# Patient Record
Sex: FEMALE | Race: WHITE | Employment: OTHER | URBAN - METROPOLITAN AREA
[De-identification: names, ages, dates, MRNs, and addresses within clinical notes are randomized per-mention and may not be internally consistent; named-entity substitution may affect disease eponyms.]

---

## 2017-01-12 PROBLEM — J18.9 PNEUMONIA: Status: ACTIVE | Noted: 2017-01-12

## 2017-01-16 ENCOUNTER — TELEPHONE (OUTPATIENT)
Dept: PULMONOLOGY | Age: 67
End: 2017-01-16

## 2017-01-16 DIAGNOSIS — J18.9 PNEUMONIA DUE TO INFECTIOUS ORGANISM, UNSPECIFIED LATERALITY, UNSPECIFIED PART OF LUNG: Primary | ICD-10-CM

## 2017-01-17 ENCOUNTER — CARE COORDINATION (OUTPATIENT)
Dept: CARE COORDINATION | Age: 67
End: 2017-01-17

## 2017-01-31 RX ORDER — TIOTROPIUM BROMIDE 18 UG/1
CAPSULE ORAL; RESPIRATORY (INHALATION)
Qty: 30 CAPSULE | Refills: 5 | Status: SHIPPED | OUTPATIENT
Start: 2017-01-31 | End: 2017-07-30 | Stop reason: SDUPTHER

## 2017-02-06 RX ORDER — LISINOPRIL AND HYDROCHLOROTHIAZIDE 12.5; 1 MG/1; MG/1
TABLET ORAL
Qty: 90 TABLET | Refills: 0 | Status: SHIPPED | OUTPATIENT
Start: 2017-02-06 | End: 2017-05-10 | Stop reason: SDUPTHER

## 2017-02-13 RX ORDER — TOLTERODINE 2 MG/1
2 CAPSULE, EXTENDED RELEASE ORAL DAILY
Qty: 90 CAPSULE | Refills: 0 | Status: SHIPPED | OUTPATIENT
Start: 2017-02-13 | End: 2017-05-14 | Stop reason: SDUPTHER

## 2017-02-21 ENCOUNTER — TELEPHONE (OUTPATIENT)
Dept: PULMONOLOGY | Age: 67
End: 2017-02-21

## 2017-02-21 ENCOUNTER — OFFICE VISIT (OUTPATIENT)
Dept: PULMONOLOGY | Age: 67
End: 2017-02-21

## 2017-02-21 ENCOUNTER — HOSPITAL ENCOUNTER (OUTPATIENT)
Dept: OTHER | Age: 67
Discharge: HOME | End: 2017-02-21
Attending: INTERNAL MEDICINE | Admitting: INTERNAL MEDICINE

## 2017-02-21 VITALS
TEMPERATURE: 98.4 F | OXYGEN SATURATION: 91 % | SYSTOLIC BLOOD PRESSURE: 122 MMHG | HEART RATE: 91 BPM | DIASTOLIC BLOOD PRESSURE: 74 MMHG | RESPIRATION RATE: 16 BRPM | WEIGHT: 108.4 LBS | HEIGHT: 66 IN | BODY MASS INDEX: 17.42 KG/M2

## 2017-02-21 DIAGNOSIS — J44.1 COPD EXACERBATION (HCC): ICD-10-CM

## 2017-02-21 DIAGNOSIS — J43.9 PULMONARY EMPHYSEMA, UNSPECIFIED EMPHYSEMA TYPE (HCC): Primary | ICD-10-CM

## 2017-02-21 DIAGNOSIS — Z72.0 TOBACCO ABUSE: ICD-10-CM

## 2017-02-21 DIAGNOSIS — J18.9 PNEUMONIA DUE TO INFECTIOUS ORGANISM, UNSPECIFIED LATERALITY, UNSPECIFIED PART OF LUNG: ICD-10-CM

## 2017-02-21 PROCEDURE — 99214 OFFICE O/P EST MOD 30 MIN: CPT | Performed by: INTERNAL MEDICINE

## 2017-03-02 ENCOUNTER — TELEPHONE (OUTPATIENT)
Dept: PULMONOLOGY | Age: 67
End: 2017-03-02

## 2017-03-03 ENCOUNTER — OFFICE VISIT (OUTPATIENT)
Dept: PULMONOLOGY | Age: 67
End: 2017-03-03

## 2017-03-03 VITALS
WEIGHT: 110 LBS | SYSTOLIC BLOOD PRESSURE: 130 MMHG | BODY MASS INDEX: 17.68 KG/M2 | RESPIRATION RATE: 25 BRPM | HEIGHT: 66 IN | OXYGEN SATURATION: 90 % | TEMPERATURE: 98.6 F | DIASTOLIC BLOOD PRESSURE: 89 MMHG | HEART RATE: 122 BPM

## 2017-03-03 DIAGNOSIS — J44.1 COPD EXACERBATION (HCC): Primary | ICD-10-CM

## 2017-03-03 DIAGNOSIS — J44.9 COPD, SEVERE (HCC): ICD-10-CM

## 2017-03-03 DIAGNOSIS — J96.01 ACUTE RESPIRATORY FAILURE WITH HYPOXIA (HCC): ICD-10-CM

## 2017-03-03 PROCEDURE — 99214 OFFICE O/P EST MOD 30 MIN: CPT | Performed by: INTERNAL MEDICINE

## 2017-03-03 RX ORDER — GUAIFENESIN 600 MG/1
600 TABLET, EXTENDED RELEASE ORAL 2 TIMES DAILY
Qty: 60 TABLET | Refills: 3 | Status: SHIPPED | OUTPATIENT
Start: 2017-03-03

## 2017-03-03 RX ORDER — AZITHROMYCIN 250 MG/1
TABLET, FILM COATED ORAL
Qty: 1 PACKET | Refills: 0 | Status: SHIPPED | OUTPATIENT
Start: 2017-03-03 | End: 2017-03-13

## 2017-03-03 RX ORDER — PREDNISONE 10 MG/1
TABLET ORAL
Qty: 30 TABLET | Refills: 0 | Status: SHIPPED | OUTPATIENT
Start: 2017-03-03 | End: 2017-03-13

## 2017-03-03 RX ORDER — FLUTICASONE PROPIONATE 50 MCG
2 SPRAY, SUSPENSION (ML) NASAL DAILY
Qty: 1 BOTTLE | Refills: 3 | Status: SHIPPED | OUTPATIENT
Start: 2017-03-03 | End: 2019-02-02 | Stop reason: SDUPTHER

## 2017-03-07 ENCOUNTER — TELEPHONE (OUTPATIENT)
Dept: PULMONOLOGY | Age: 67
End: 2017-03-07

## 2017-03-07 RX ORDER — BUDESONIDE AND FORMOTEROL FUMARATE DIHYDRATE 80; 4.5 UG/1; UG/1
2 AEROSOL RESPIRATORY (INHALATION) 2 TIMES DAILY
Qty: 1 INHALER | Refills: 5 | Status: SHIPPED | OUTPATIENT
Start: 2017-03-07 | End: 2017-08-09 | Stop reason: SDUPTHER

## 2017-03-09 ENCOUNTER — TELEPHONE (OUTPATIENT)
Dept: PULMONOLOGY | Age: 67
End: 2017-03-09

## 2017-05-09 ENCOUNTER — HOSPITAL ENCOUNTER (OUTPATIENT)
Dept: PULMONOLOGY | Age: 67
Discharge: HOME | End: 2017-05-09
Attending: INTERNAL MEDICINE | Admitting: INTERNAL MEDICINE

## 2017-05-09 ENCOUNTER — OFFICE VISIT (OUTPATIENT)
Dept: PULMONOLOGY | Age: 67
End: 2017-05-09

## 2017-05-09 ENCOUNTER — TELEPHONE (OUTPATIENT)
Dept: PULMONOLOGY | Age: 67
End: 2017-05-09

## 2017-05-09 VITALS
HEIGHT: 66 IN | WEIGHT: 111 LBS | DIASTOLIC BLOOD PRESSURE: 77 MMHG | HEART RATE: 61 BPM | SYSTOLIC BLOOD PRESSURE: 128 MMHG | BODY MASS INDEX: 17.84 KG/M2 | RESPIRATION RATE: 16 BRPM | OXYGEN SATURATION: 93 % | TEMPERATURE: 97.5 F

## 2017-05-09 VITALS — OXYGEN SATURATION: 91 %

## 2017-05-09 DIAGNOSIS — Z72.0 TOBACCO ABUSE: ICD-10-CM

## 2017-05-09 DIAGNOSIS — J43.9 PULMONARY EMPHYSEMA, UNSPECIFIED EMPHYSEMA TYPE (HCC): Primary | ICD-10-CM

## 2017-05-09 DIAGNOSIS — J44.9 COPD, SEVERE (HCC): Primary | ICD-10-CM

## 2017-05-09 DIAGNOSIS — J44.9 CHRONIC OBSTRUCTIVE PULMONARY DISEASE (HCC): ICD-10-CM

## 2017-05-09 DIAGNOSIS — J30.9 ALLERGIC RHINITIS, UNSPECIFIED ALLERGIC RHINITIS TRIGGER, UNSPECIFIED RHINITIS SEASONALITY: ICD-10-CM

## 2017-05-09 PROCEDURE — 99214 OFFICE O/P EST MOD 30 MIN: CPT | Performed by: INTERNAL MEDICINE

## 2017-05-09 RX ORDER — ALBUTEROL SULFATE 2.5 MG/3ML
2.5 SOLUTION RESPIRATORY (INHALATION) ONCE
Status: COMPLETED | OUTPATIENT
Start: 2017-05-09 | End: 2017-05-09

## 2017-05-09 RX ADMIN — ALBUTEROL SULFATE 2.5 MG: 2.5 SOLUTION RESPIRATORY (INHALATION) at 13:51

## 2017-05-10 RX ORDER — LISINOPRIL AND HYDROCHLOROTHIAZIDE 12.5; 1 MG/1; MG/1
TABLET ORAL
Qty: 90 TABLET | Refills: 0 | Status: SHIPPED | OUTPATIENT
Start: 2017-05-10 | End: 2017-08-12 | Stop reason: SDUPTHER

## 2017-05-15 RX ORDER — TOLTERODINE 2 MG/1
CAPSULE, EXTENDED RELEASE ORAL
Qty: 90 CAPSULE | Refills: 1 | Status: SHIPPED | OUTPATIENT
Start: 2017-05-15 | End: 2017-11-11 | Stop reason: SDUPTHER

## 2017-06-06 RX ORDER — NAPROXEN 500 MG/1
TABLET ORAL
Qty: 180 TABLET | Refills: 0 | Status: SHIPPED | OUTPATIENT
Start: 2017-06-06 | End: 2017-09-17 | Stop reason: SDUPTHER

## 2017-07-03 RX ORDER — CYCLOBENZAPRINE HCL 10 MG
TABLET ORAL
Qty: 90 TABLET | Refills: 2 | Status: SHIPPED | OUTPATIENT
Start: 2017-07-03 | End: 2018-05-14 | Stop reason: SDUPTHER

## 2017-07-13 ENCOUNTER — TELEPHONE (OUTPATIENT)
Dept: OTHER | Facility: CLINIC | Age: 67
End: 2017-07-13

## 2017-07-30 DIAGNOSIS — J43.9 PULMONARY EMPHYSEMA, UNSPECIFIED EMPHYSEMA TYPE (HCC): Primary | ICD-10-CM

## 2017-07-31 RX ORDER — TIOTROPIUM BROMIDE 18 UG/1
CAPSULE ORAL; RESPIRATORY (INHALATION)
Qty: 90 CAPSULE | Refills: 0 | Status: SHIPPED | OUTPATIENT
Start: 2017-07-31 | End: 2017-10-31 | Stop reason: SDUPTHER

## 2017-08-09 ENCOUNTER — OFFICE VISIT (OUTPATIENT)
Dept: PULMONOLOGY | Age: 67
End: 2017-08-09

## 2017-08-09 VITALS
BODY MASS INDEX: 18.48 KG/M2 | HEART RATE: 82 BPM | OXYGEN SATURATION: 93 % | HEIGHT: 66 IN | WEIGHT: 115 LBS | SYSTOLIC BLOOD PRESSURE: 128 MMHG | DIASTOLIC BLOOD PRESSURE: 66 MMHG

## 2017-08-09 DIAGNOSIS — Z91.09 ENVIRONMENTAL ALLERGIES: ICD-10-CM

## 2017-08-09 DIAGNOSIS — J43.9 PULMONARY EMPHYSEMA, UNSPECIFIED EMPHYSEMA TYPE (HCC): ICD-10-CM

## 2017-08-09 DIAGNOSIS — J44.9 CHRONIC OBSTRUCTIVE PULMONARY DISEASE, UNSPECIFIED COPD TYPE (HCC): Primary | ICD-10-CM

## 2017-08-09 DIAGNOSIS — J30.9 ALLERGIC RHINITIS, UNSPECIFIED ALLERGIC RHINITIS TRIGGER, UNSPECIFIED RHINITIS SEASONALITY: ICD-10-CM

## 2017-08-09 PROCEDURE — 99214 OFFICE O/P EST MOD 30 MIN: CPT | Performed by: INTERNAL MEDICINE

## 2017-08-09 RX ORDER — BUDESONIDE AND FORMOTEROL FUMARATE DIHYDRATE 80; 4.5 UG/1; UG/1
2 AEROSOL RESPIRATORY (INHALATION) 2 TIMES DAILY
Qty: 1 INHALER | Refills: 5 | Status: SHIPPED | OUTPATIENT
Start: 2017-08-09 | End: 2021-12-27 | Stop reason: SDUPTHER

## 2017-09-18 RX ORDER — NAPROXEN 500 MG/1
TABLET ORAL
Qty: 180 TABLET | Refills: 0 | Status: SHIPPED | OUTPATIENT
Start: 2017-09-18 | End: 2018-02-18 | Stop reason: SDUPTHER

## 2017-10-31 DIAGNOSIS — J43.9 PULMONARY EMPHYSEMA, UNSPECIFIED EMPHYSEMA TYPE (HCC): ICD-10-CM

## 2017-10-31 RX ORDER — TIOTROPIUM BROMIDE 18 UG/1
CAPSULE ORAL; RESPIRATORY (INHALATION)
Qty: 90 CAPSULE | Refills: 0 | Status: SHIPPED | OUTPATIENT
Start: 2017-10-31 | End: 2018-01-29 | Stop reason: SDUPTHER

## 2017-11-13 RX ORDER — TOLTERODINE 2 MG/1
CAPSULE, EXTENDED RELEASE ORAL
Qty: 90 CAPSULE | Refills: 1 | Status: SHIPPED | OUTPATIENT
Start: 2017-11-13 | End: 2018-03-12

## 2017-12-01 RX ORDER — MONTELUKAST SODIUM 10 MG/1
TABLET ORAL
Qty: 90 TABLET | Refills: 0 | Status: SHIPPED | OUTPATIENT
Start: 2017-12-01 | End: 2018-03-01 | Stop reason: SDUPTHER

## 2017-12-12 ENCOUNTER — OFFICE VISIT (OUTPATIENT)
Dept: PULMONOLOGY | Age: 67
End: 2017-12-12

## 2017-12-12 VITALS
SYSTOLIC BLOOD PRESSURE: 138 MMHG | HEIGHT: 66 IN | WEIGHT: 116 LBS | DIASTOLIC BLOOD PRESSURE: 84 MMHG | RESPIRATION RATE: 20 BRPM | OXYGEN SATURATION: 95 % | BODY MASS INDEX: 18.64 KG/M2 | TEMPERATURE: 97.8 F | HEART RATE: 72 BPM

## 2017-12-12 DIAGNOSIS — F41.9 ANXIETY: ICD-10-CM

## 2017-12-12 DIAGNOSIS — Z23 NEED FOR PNEUMOCOCCAL VACCINATION: ICD-10-CM

## 2017-12-12 DIAGNOSIS — J30.9 CHRONIC ALLERGIC RHINITIS, UNSPECIFIED SEASONALITY, UNSPECIFIED TRIGGER: ICD-10-CM

## 2017-12-12 DIAGNOSIS — J44.9 CHRONIC OBSTRUCTIVE PULMONARY DISEASE, UNSPECIFIED COPD TYPE (HCC): ICD-10-CM

## 2017-12-12 DIAGNOSIS — Z23 NEED FOR INFLUENZA VACCINATION: Primary | ICD-10-CM

## 2017-12-12 PROCEDURE — 90472 IMMUNIZATION ADMIN EACH ADD: CPT | Performed by: INTERNAL MEDICINE

## 2017-12-12 PROCEDURE — 90732 PPSV23 VACC 2 YRS+ SUBQ/IM: CPT | Performed by: INTERNAL MEDICINE

## 2017-12-12 PROCEDURE — 90662 IIV NO PRSV INCREASED AG IM: CPT | Performed by: INTERNAL MEDICINE

## 2017-12-12 PROCEDURE — 90471 IMMUNIZATION ADMIN: CPT | Performed by: INTERNAL MEDICINE

## 2017-12-12 PROCEDURE — 99214 OFFICE O/P EST MOD 30 MIN: CPT | Performed by: INTERNAL MEDICINE

## 2017-12-12 NOTE — PROGRESS NOTES
[facility] Pulmonary, Critical Care, and Sleep    Outpatient Follow Up Note    CC: COPD  Consulting provider: Dr. Jay Rollins    Interval History: 78 y.o. female SOB is worse with anxiety but otherwise is stable. She does not use her inhalers every day. No wheezing. No exacerbations. Initial HPI: referred for COPD. Does not use inhaler daily. Does wheeze every day and has dry cough daily. Uses PRN inhaler less than once per week. Uses advair less than once per week. She waits until symptoms are flared and then stars using inhalers when she can hardly breath. She states she has some environmental/seasonal allergies that cause wheezing and itchy eyes.      Current Medications:    Current Outpatient Prescriptions:     montelukast (SINGULAIR) 10 MG tablet, TAKE 1 TABLET NIGHTLY, Disp: 90 tablet, Rfl: 0    tolterodine (DETROL LA) 2 MG extended release capsule, TAKE 1 CAPSULE DAILY, Disp: 90 capsule, Rfl: 1    SPIRIVA HANDIHALER 18 MCG inhalation capsule, INHALE THE CONTENTS OF 1 CAPSULE DAILY, Disp: 90 capsule, Rfl: 0    verapamil (CALAN SR) 120 MG extended release tablet, TAKE 2 TABLETS AT BEDTIME, Disp: 180 tablet, Rfl: 0    naproxen (NAPROSYN) 500 MG tablet, TAKE ONE TABLET BY MOUTH TWICE A DAY WITH FOOD, Disp: 180 tablet, Rfl: 0    lisinopril-hydrochlorothiazide (PRINZIDE;ZESTORETIC) 10-12.5 MG per tablet, TAKE ONE TABLET BY MOUTH DAILY, Disp: 90 tablet, Rfl: 1    budesonide-formoterol (SYMBICORT) 80-4.5 MCG/ACT AERO, Inhale 2 puffs into the lungs 2 times daily, Disp: 1 Inhaler, Rfl: 5    cyclobenzaprine (FLEXERIL) 10 MG tablet, TAKE ONE TABLET BY MOUTH THREE TIMES A DAY AS NEEDED, Disp: 90 tablet, Rfl: 2    guaiFENesin (MUCINEX) 600 MG extended release tablet, Take 1 tablet by mouth 2 times daily, Disp: 60 tablet, Rfl: 3    fluticasone (FLONASE) 50 MCG/ACT nasal spray, 2 sprays by Nasal route daily, Disp: 1 Bottle, Rfl: 3    nicotine (NICODERM CQ) 7 MG/24HR, Place 1 patch onto the skin every 24 hours, Disp: 30 patch, Rfl: 3    VESICARE 5 MG tablet, TAKE 1 TABLET DAILY, Disp: 90 tablet, Rfl: 1    Loratadine 10 MG CAPS, Take by mouth daily, Disp: , Rfl:     lidocaine (LIDODERM) 5 %, Place 1 patch onto the skin daily. 12 hours on, 12 hours off., Disp: 90 patch, Rfl: 0    Multiple Vitamins-Minerals (MULTIVITAMIN PO), Take 1 tablet by mouth daily. , Disp: , Rfl:     Potassium Gluconate 550 MG TABS, Take 1 tablet by mouth daily. , Disp: , Rfl:     Objective:   PHYSICAL EXAM:    /84 (Site: Left Arm, Position: Sitting, Cuff Size: Small Adult)   Pulse 72   Temp 97.8 °F (36.6 °C) (Oral)   Resp 20   Ht 5' 6\" (1.676 m)   Wt 116 lb (52.6 kg)   SpO2 95% Comment: RA  BMI 18.72 kg/m²   Constitutional: In no acute distress. Appears stated age. Well developed and nourished  Eyes: PERRL. No sclera icterus. No conjunctival injection. ENT: Oropharynx with mild erythema. Neck: Trachea midline. No thyroid tenderness. Lymph: No cervical LAD. No supraclavicular LAD. Resp: No accessory muscle use. No crackles. no  wheezes. No rhonchi. CV: Regular rate. Regular rhythm. No murmur or rub. No lower extremity edema. Musc: No clubbing. No cyanosis. No synovitis or joint deformity in digits. Psych: Oriented x 3. Mood and affect normal. Intact judgment and insight. LABS:  Reviewed any pertinent new labs that are available.     PFTs   8/16/14 FVC  (%) FEV1  1.11 L(42%) FEV1/FVC ratio  52  TLC  (85%)  RV  (122%)   DLCO (46%) Bronchodilator response:no  5/9/17 FVC  (77%) FEV1  1.33 L(51%) FEV1/FVC ratio  51  TLC  (92%)  RV  (128%)   DLCO (46%) Bronchodilator response:no    6MWT: 720ft    IMAGING:  I personally reviewed and interpreted the following today in the office:   9/17/13 CXR: Emphysema/hyperinflation   2/21/17 CXR: clear lungs    ASSESSMENT:   Severe COPD with emphysema  Seasonal/environmental allergies   Allergic Rhinitis   Tobacco abuse - quit 2/2017  Over 30 pack year smoking history  Anxiety    PLAN:   Continue

## 2017-12-12 NOTE — PROGRESS NOTES
Vaccine Information Sheet, \"Influenza - Inactivated\"  given to Alysia Ellsworth, or parent/legal guardian of  Alysia Ellsworth and verbalized understanding. Patient responses:    Have you ever had a reaction to a flu vaccine? No  Are you able to eat eggs without adverse effects? Yes  Do you have any current illness? No  Have you ever had Guillian [place] Syndrome? No    Flu vaccine given per order. Please see immunization tab.

## 2018-01-29 DIAGNOSIS — J43.9 PULMONARY EMPHYSEMA, UNSPECIFIED EMPHYSEMA TYPE (HCC): ICD-10-CM

## 2018-01-30 RX ORDER — TIOTROPIUM BROMIDE 18 UG/1
CAPSULE ORAL; RESPIRATORY (INHALATION)
Qty: 90 CAPSULE | Refills: 1 | Status: SHIPPED | OUTPATIENT
Start: 2018-01-30 | End: 2018-07-29 | Stop reason: SDUPTHER

## 2018-02-19 RX ORDER — LISINOPRIL AND HYDROCHLOROTHIAZIDE 12.5; 1 MG/1; MG/1
TABLET ORAL
Qty: 90 TABLET | Refills: 0 | Status: SHIPPED | OUTPATIENT
Start: 2018-02-19 | End: 2018-05-21 | Stop reason: SDUPTHER

## 2018-02-19 RX ORDER — NAPROXEN 500 MG/1
TABLET ORAL
Qty: 180 TABLET | Refills: 0 | Status: SHIPPED | OUTPATIENT
Start: 2018-02-19 | End: 2018-12-17 | Stop reason: SDUPTHER

## 2018-03-01 RX ORDER — MONTELUKAST SODIUM 10 MG/1
TABLET ORAL
Qty: 90 TABLET | Refills: 0 | Status: SHIPPED | OUTPATIENT
Start: 2018-03-01 | End: 2018-05-30 | Stop reason: SDUPTHER

## 2018-03-12 ENCOUNTER — OFFICE VISIT (OUTPATIENT)
Dept: FAMILY MEDICINE CLINIC | Age: 68
End: 2018-03-12

## 2018-03-12 VITALS
HEIGHT: 66 IN | HEART RATE: 69 BPM | SYSTOLIC BLOOD PRESSURE: 130 MMHG | BODY MASS INDEX: 18.96 KG/M2 | DIASTOLIC BLOOD PRESSURE: 72 MMHG | OXYGEN SATURATION: 97 % | WEIGHT: 118 LBS

## 2018-03-12 DIAGNOSIS — I10 ESSENTIAL HYPERTENSION: Primary | ICD-10-CM

## 2018-03-12 DIAGNOSIS — M15.9 PRIMARY OSTEOARTHRITIS INVOLVING MULTIPLE JOINTS: ICD-10-CM

## 2018-03-12 DIAGNOSIS — I73.00 RAYNAUD'S DISEASE WITHOUT GANGRENE: ICD-10-CM

## 2018-03-12 DIAGNOSIS — N39.46 URINARY INCONTINENCE, MIXED: ICD-10-CM

## 2018-03-12 DIAGNOSIS — F41.9 ANXIETY: ICD-10-CM

## 2018-03-12 PROCEDURE — 99214 OFFICE O/P EST MOD 30 MIN: CPT | Performed by: FAMILY MEDICINE

## 2018-03-12 RX ORDER — OXYBUTYNIN CHLORIDE 5 MG/1
TABLET ORAL
Qty: 60 TABLET | Refills: 0 | Status: SHIPPED | OUTPATIENT
Start: 2018-03-12 | End: 2018-05-01 | Stop reason: SDUPTHER

## 2018-03-12 RX ORDER — CITALOPRAM 10 MG/1
TABLET ORAL
Qty: 60 TABLET | Refills: 0 | Status: SHIPPED | OUTPATIENT
Start: 2018-03-12 | End: 2018-05-01 | Stop reason: SDUPTHER

## 2018-03-12 ASSESSMENT — ENCOUNTER SYMPTOMS
SHORTNESS OF BREATH: 1
GASTROINTESTINAL NEGATIVE: 1

## 2018-03-12 ASSESSMENT — PATIENT HEALTH QUESTIONNAIRE - PHQ9
SUM OF ALL RESPONSES TO PHQ QUESTIONS 1-9: 0
2. FEELING DOWN, DEPRESSED OR HOPELESS: 0
1. LITTLE INTEREST OR PLEASURE IN DOING THINGS: 0
SUM OF ALL RESPONSES TO PHQ9 QUESTIONS 1 & 2: 0

## 2018-03-12 NOTE — PROGRESS NOTES
and thought content normal.       Assessment:      1. Essential hypertension    2. Urinary incontinence, mixed    3. Raynaud's disease without gangrene    4. Primary osteoarthritis involving multiple joints              Plan:      Saul Hu was seen today for hypertension, copd and hand pain.     Diagnoses and all orders for this visit:    Essential hypertension  Continue meds-FERNY diet  Urinary incontinence, mixed  Rx Oxybutrin-call me 3 weeks  Raynaud's disease without gangrene  Continue meds,gloves  Primary osteoarthritis involving multiple joints  ASA as needed  Other orders  -     oxybutynin (DITROPAN) 5 MG tablet; 1 daily x 2 weeks then 1 bid    See me 6 mos

## 2018-05-02 RX ORDER — CITALOPRAM 10 MG/1
TABLET ORAL
Qty: 90 TABLET | Refills: 0 | Status: SHIPPED | OUTPATIENT
Start: 2018-05-02 | End: 2018-05-07

## 2018-05-02 RX ORDER — OXYBUTYNIN CHLORIDE 5 MG/1
TABLET ORAL
Qty: 180 TABLET | Refills: 0 | Status: SHIPPED | OUTPATIENT
Start: 2018-05-02 | End: 2018-07-14 | Stop reason: SDUPTHER

## 2018-05-14 RX ORDER — CYCLOBENZAPRINE HCL 10 MG
TABLET ORAL
Qty: 90 TABLET | Refills: 1 | Status: SHIPPED | OUTPATIENT
Start: 2018-05-14 | End: 2019-07-01 | Stop reason: SDUPTHER

## 2018-05-21 RX ORDER — LISINOPRIL AND HYDROCHLOROTHIAZIDE 12.5; 1 MG/1; MG/1
TABLET ORAL
Qty: 90 TABLET | Refills: 0 | Status: SHIPPED | OUTPATIENT
Start: 2018-05-21 | End: 2018-08-13 | Stop reason: SDUPTHER

## 2018-05-30 RX ORDER — MONTELUKAST SODIUM 10 MG/1
TABLET ORAL
Qty: 90 TABLET | Refills: 0 | Status: SHIPPED | OUTPATIENT
Start: 2018-05-30 | End: 2018-08-28 | Stop reason: SDUPTHER

## 2018-06-12 ENCOUNTER — OFFICE VISIT (OUTPATIENT)
Dept: PULMONOLOGY | Age: 68
End: 2018-06-12

## 2018-06-12 VITALS
OXYGEN SATURATION: 95 % | BODY MASS INDEX: 18.64 KG/M2 | WEIGHT: 116 LBS | HEIGHT: 66 IN | HEART RATE: 64 BPM | SYSTOLIC BLOOD PRESSURE: 124 MMHG | RESPIRATION RATE: 21 BRPM | DIASTOLIC BLOOD PRESSURE: 84 MMHG

## 2018-06-12 DIAGNOSIS — J30.1 ACUTE SEASONAL ALLERGIC RHINITIS DUE TO POLLEN: ICD-10-CM

## 2018-06-12 DIAGNOSIS — Z91.09 ENVIRONMENTAL ALLERGIES: ICD-10-CM

## 2018-06-12 DIAGNOSIS — J44.9 COPD, SEVERE (HCC): Primary | ICD-10-CM

## 2018-06-12 PROCEDURE — 99214 OFFICE O/P EST MOD 30 MIN: CPT | Performed by: INTERNAL MEDICINE

## 2018-06-12 RX ORDER — LORATADINE 10 MG/1
10 TABLET ORAL DAILY
Qty: 30 TABLET | Refills: 5 | Status: SHIPPED | OUTPATIENT
Start: 2018-06-12 | End: 2020-06-11

## 2018-07-02 NOTE — TELEPHONE ENCOUNTER
Last Seen: 3/12/2018  Next Appointment: Visit date not found    Requested Prescriptions     Pending Prescriptions Disp Refills    verapamil (CALAN SR) 120 MG extended release tablet [Pharmacy Med Name: VERAPAMIL HCL ER TABS 120MG] 180 tablet 1     Sig: TAKE 2 TABLETS AT BEDTIME

## 2018-07-29 DIAGNOSIS — J43.9 PULMONARY EMPHYSEMA, UNSPECIFIED EMPHYSEMA TYPE (HCC): ICD-10-CM

## 2018-07-30 RX ORDER — TIOTROPIUM BROMIDE 18 UG/1
CAPSULE ORAL; RESPIRATORY (INHALATION)
Qty: 90 CAPSULE | Refills: 1 | Status: SHIPPED | OUTPATIENT
Start: 2018-07-30 | End: 2019-01-26 | Stop reason: SDUPTHER

## 2018-08-28 RX ORDER — MONTELUKAST SODIUM 10 MG/1
TABLET ORAL
Qty: 90 TABLET | Refills: 1 | Status: SHIPPED | OUTPATIENT
Start: 2018-08-28 | End: 2019-02-24 | Stop reason: SDUPTHER

## 2018-12-11 ENCOUNTER — OFFICE VISIT (OUTPATIENT)
Dept: PULMONOLOGY | Age: 68
End: 2018-12-11
Payer: COMMERCIAL

## 2018-12-11 VITALS
HEIGHT: 66 IN | SYSTOLIC BLOOD PRESSURE: 108 MMHG | WEIGHT: 120 LBS | RESPIRATION RATE: 16 BRPM | HEART RATE: 76 BPM | DIASTOLIC BLOOD PRESSURE: 68 MMHG | TEMPERATURE: 98.1 F | BODY MASS INDEX: 19.29 KG/M2 | OXYGEN SATURATION: 92 %

## 2018-12-11 DIAGNOSIS — J44.9 COPD, SEVERE (HCC): ICD-10-CM

## 2018-12-11 DIAGNOSIS — Z91.09 ENVIRONMENTAL ALLERGIES: ICD-10-CM

## 2018-12-11 DIAGNOSIS — Z87.891 PERSONAL HISTORY OF TOBACCO USE: Primary | ICD-10-CM

## 2018-12-11 PROCEDURE — 99214 OFFICE O/P EST MOD 30 MIN: CPT | Performed by: INTERNAL MEDICINE

## 2018-12-11 PROCEDURE — G0296 VISIT TO DETERM LDCT ELIG: HCPCS | Performed by: INTERNAL MEDICINE

## 2018-12-18 RX ORDER — NAPROXEN 500 MG/1
TABLET ORAL
Qty: 180 TABLET | Refills: 0 | Status: SHIPPED | OUTPATIENT
Start: 2018-12-18 | End: 2019-07-01 | Stop reason: SDUPTHER

## 2018-12-18 NOTE — TELEPHONE ENCOUNTER
.  Last office visit  3/12/18    Last written 2/19/18 #180 with no refills     Next office visit scheduled none    Requested Prescriptions     Pending Prescriptions Disp Refills    naproxen (NAPROSYN) 500 MG tablet [Pharmacy Med Name: NAPROXEN 500 MG TABLET] 180 tablet 0     Sig: TAKE ONE TABLET BY MOUTH TWICE A DAY WITH FOOD

## 2019-01-26 DIAGNOSIS — J43.9 PULMONARY EMPHYSEMA, UNSPECIFIED EMPHYSEMA TYPE (HCC): ICD-10-CM

## 2019-01-28 RX ORDER — TIOTROPIUM BROMIDE 18 UG/1
CAPSULE ORAL; RESPIRATORY (INHALATION)
Qty: 90 CAPSULE | Refills: 1 | Status: SHIPPED | OUTPATIENT
Start: 2019-01-28 | End: 2021-09-13 | Stop reason: SDUPTHER

## 2019-02-04 RX ORDER — FLUTICASONE PROPIONATE 50 MCG
SPRAY, SUSPENSION (ML) NASAL
Qty: 1 BOTTLE | Refills: 5 | Status: SHIPPED | OUTPATIENT
Start: 2019-02-04

## 2019-02-25 RX ORDER — MONTELUKAST SODIUM 10 MG/1
TABLET ORAL
Qty: 90 TABLET | Refills: 3 | Status: SHIPPED | OUTPATIENT
Start: 2019-02-25 | End: 2020-02-20

## 2019-06-18 ENCOUNTER — OFFICE VISIT (OUTPATIENT)
Dept: PULMONOLOGY | Age: 69
End: 2019-06-18
Payer: COMMERCIAL

## 2019-06-18 VITALS
BODY MASS INDEX: 18 KG/M2 | DIASTOLIC BLOOD PRESSURE: 78 MMHG | TEMPERATURE: 98.3 F | SYSTOLIC BLOOD PRESSURE: 112 MMHG | OXYGEN SATURATION: 93 % | WEIGHT: 112 LBS | HEIGHT: 66 IN | HEART RATE: 82 BPM | RESPIRATION RATE: 16 BRPM

## 2019-06-18 DIAGNOSIS — Z87.891 PERSONAL HISTORY OF TOBACCO USE: ICD-10-CM

## 2019-06-18 DIAGNOSIS — J30.1 ACUTE SEASONAL ALLERGIC RHINITIS DUE TO POLLEN: ICD-10-CM

## 2019-06-18 DIAGNOSIS — J44.9 COPD, SEVERE (HCC): Primary | ICD-10-CM

## 2019-06-18 PROCEDURE — 99214 OFFICE O/P EST MOD 30 MIN: CPT | Performed by: INTERNAL MEDICINE

## 2019-06-18 NOTE — PROGRESS NOTES
Disp: 90 tablet, Rfl: 1    DiphenhydrAMINE HCl (BENADRYL ALLERGY PO), Take by mouth, Disp: , Rfl:     budesonide-formoterol (SYMBICORT) 80-4.5 MCG/ACT AERO, Inhale 2 puffs into the lungs 2 times daily, Disp: 1 Inhaler, Rfl: 5    guaiFENesin (MUCINEX) 600 MG extended release tablet, Take 1 tablet by mouth 2 times daily, Disp: 60 tablet, Rfl: 3    Loratadine 10 MG CAPS, Take by mouth daily, Disp: , Rfl:     lidocaine (LIDODERM) 5 %, Place 1 patch onto the skin daily. 12 hours on, 12 hours off., Disp: 90 patch, Rfl: 0    Multiple Vitamins-Minerals (MULTIVITAMIN PO), Take 1 tablet by mouth daily. , Disp: , Rfl:     Potassium Gluconate 550 MG TABS, Take 1 tablet by mouth daily. , Disp: , Rfl:     Objective:   PHYSICAL EXAM:    /78   Pulse 82   Temp 98.3 °F (36.8 °C) (Oral)   Resp 16   Ht 5' 6\" (1.676 m)   Wt 112 lb (50.8 kg)   SpO2 93% Comment: ra  BMI 18.08 kg/m²   Constitutional: In no acute distress. Appears stated age. Well developed and nourished  Eyes: PERRL. No sclera icterus. No conjunctival injection. ENT: Oropharynx with mild erythema. Neck: Trachea midline. No thyroid tenderness. Lymph: No cervical LAD. No supraclavicular LAD. Resp: No accessory muscle use. No crackles. No  wheezes. No rhonchi. CV: Regular rate. Regular rhythm. No murmur or rub. No lower extremity edema. Musc: No clubbing. No cyanosis. No synovitis or joint deformity in digits. Psych: Oriented x 3. Mood and affect normal. Intact judgment and insight. LABS:  Reviewed any pertinent new labs that are available.     PFTs   8/16/14 FVC  (%) FEV1  1.11 L(42%) FEV1/FVC ratio  52  TLC  (85%)  RV  (122%)   DLCO (46%) Bronchodilator response:no  5/9/17 FVC  (77%) FEV1  1.33 L(51%) FEV1/FVC ratio  51  TLC  (92%)  RV  (128%)   DLCO (46%) Bronchodilator response:no    6MWT: 720ft    IMAGING:  I personally reviewed and interpreted the following today in the office:   9/17/13 CXR: Emphysema/hyperinflation   2/21/17 CXR: clear lungs    ASSESSMENT:   Severe COPD   Seasonal/environmental allergies   Allergic Rhinitis   Tobacco abuse - quit 1/2017  Over 30 pack year smoking history  Anxiety and depression    PLAN:   Continue Symbicort and Spiriva   Continue PRN albuterol   Continue Singulair  Claritin and Flonase  Pulm rehab referral placed but patient too busy to attend  F/u in 6 months  No objections for cataract surgery and no need for pre-op pulmonary testing  Return for LDCT  Screening CT scan was considered in a lung cancer screening counseling and shared decision making visit today that included the following elements:   Eligibility: Age:69. There are no signs or symptoms of lung cancer. Tobacco History 55 pack-years, quit 2 years ago in 2017  Verbal counseling has been performed by me to include benefits and harms of screening, follow-up diagnostic testing, over-diagnosis, false positive rate, and total radiation exposure;   I have counseled on the importance of adherence to annual lung cancer LDCT screening, the impact of comorbidities and patient is willing to undergo diagnosis and treatment;   I have provided counseling on the importance of maintaining cigarette smoking abstinence if former smoker; or the importance of smoking cessation if current smoker and, if appropriate, furnishing of information about tobacco cessation interventions; and   I have furnished a written order for lung cancer screening with LDCT. Order for Screening chest CT scan should be placed with documentation as below:  Beneficiary date of birth;    Actual pack - year smoking history (number) from above;   Current smoking status, and for former smokers, the number of years since quitting smoking from above  Beneficiary is asymptomatic   National Provider Identifier (NPI) for Dr. Rodriguez Jolly

## 2019-07-01 ENCOUNTER — TELEPHONE (OUTPATIENT)
Dept: FAMILY MEDICINE CLINIC | Age: 69
End: 2019-07-01

## 2019-07-01 ENCOUNTER — OFFICE VISIT (OUTPATIENT)
Dept: FAMILY MEDICINE CLINIC | Age: 69
End: 2019-07-01
Payer: MEDICARE

## 2019-07-01 VITALS
DIASTOLIC BLOOD PRESSURE: 75 MMHG | SYSTOLIC BLOOD PRESSURE: 135 MMHG | HEART RATE: 90 BPM | WEIGHT: 112.2 LBS | BODY MASS INDEX: 18.03 KG/M2 | HEIGHT: 66 IN | OXYGEN SATURATION: 92 %

## 2019-07-01 DIAGNOSIS — J30.1 SEASONAL ALLERGIC RHINITIS DUE TO POLLEN: ICD-10-CM

## 2019-07-01 DIAGNOSIS — F41.9 ANXIETY: ICD-10-CM

## 2019-07-01 DIAGNOSIS — I10 ESSENTIAL HYPERTENSION: Primary | ICD-10-CM

## 2019-07-01 PROCEDURE — 4040F PNEUMOC VAC/ADMIN/RCVD: CPT | Performed by: FAMILY MEDICINE

## 2019-07-01 PROCEDURE — 1090F PRES/ABSN URINE INCON ASSESS: CPT | Performed by: FAMILY MEDICINE

## 2019-07-01 PROCEDURE — 1123F ACP DISCUSS/DSCN MKR DOCD: CPT | Performed by: FAMILY MEDICINE

## 2019-07-01 PROCEDURE — G8400 PT W/DXA NO RESULTS DOC: HCPCS | Performed by: FAMILY MEDICINE

## 2019-07-01 PROCEDURE — 3017F COLORECTAL CA SCREEN DOC REV: CPT | Performed by: FAMILY MEDICINE

## 2019-07-01 PROCEDURE — G0444 DEPRESSION SCREEN ANNUAL: HCPCS | Performed by: FAMILY MEDICINE

## 2019-07-01 PROCEDURE — G8427 DOCREV CUR MEDS BY ELIG CLIN: HCPCS | Performed by: FAMILY MEDICINE

## 2019-07-01 PROCEDURE — 99214 OFFICE O/P EST MOD 30 MIN: CPT | Performed by: FAMILY MEDICINE

## 2019-07-01 PROCEDURE — 1036F TOBACCO NON-USER: CPT | Performed by: FAMILY MEDICINE

## 2019-07-01 PROCEDURE — G8419 CALC BMI OUT NRM PARAM NOF/U: HCPCS | Performed by: FAMILY MEDICINE

## 2019-07-01 RX ORDER — CYCLOBENZAPRINE HCL 10 MG
TABLET ORAL
Qty: 90 TABLET | Refills: 1 | Status: SHIPPED | OUTPATIENT
Start: 2019-07-01 | End: 2020-10-23

## 2019-07-01 RX ORDER — CITALOPRAM 20 MG/1
20 TABLET ORAL DAILY
Qty: 90 TABLET | Refills: 1 | Status: SHIPPED | OUTPATIENT
Start: 2019-07-01 | End: 2019-12-24

## 2019-07-01 ASSESSMENT — PATIENT HEALTH QUESTIONNAIRE - PHQ9
4. FEELING TIRED OR HAVING LITTLE ENERGY: 3
1. LITTLE INTEREST OR PLEASURE IN DOING THINGS: 3
3. TROUBLE FALLING OR STAYING ASLEEP: 0
9. THOUGHTS THAT YOU WOULD BE BETTER OFF DEAD, OR OF HURTING YOURSELF: 1
8. MOVING OR SPEAKING SO SLOWLY THAT OTHER PEOPLE COULD HAVE NOTICED. OR THE OPPOSITE, BEING SO FIGETY OR RESTLESS THAT YOU HAVE BEEN MOVING AROUND A LOT MORE THAN USUAL: 1
SUM OF ALL RESPONSES TO PHQ9 QUESTIONS 1 & 2: 6
7. TROUBLE CONCENTRATING ON THINGS, SUCH AS READING THE NEWSPAPER OR WATCHING TELEVISION: 1
2. FEELING DOWN, DEPRESSED OR HOPELESS: 3
SUM OF ALL RESPONSES TO PHQ QUESTIONS 1-9: 15
10. IF YOU CHECKED OFF ANY PROBLEMS, HOW DIFFICULT HAVE THESE PROBLEMS MADE IT FOR YOU TO DO YOUR WORK, TAKE CARE OF THINGS AT HOME, OR GET ALONG WITH OTHER PEOPLE: 1
SUM OF ALL RESPONSES TO PHQ QUESTIONS 1-9: 15
5. POOR APPETITE OR OVEREATING: 2
6. FEELING BAD ABOUT YOURSELF - OR THAT YOU ARE A FAILURE OR HAVE LET YOURSELF OR YOUR FAMILY DOWN: 1

## 2019-07-01 ASSESSMENT — ENCOUNTER SYMPTOMS
ABDOMINAL PAIN: 0
COUGH: 0
BLOOD IN STOOL: 0
RHINORRHEA: 1

## 2019-07-01 NOTE — PROGRESS NOTES
Subjective:      Patient ID: Tera Dyer is a 77 y.o. female. HPI  Patient in for a general checkup. Her  is passed and she has very busy with things she has to do. Hypertension-this is been under control at home and at the store. She feels like her anxiety level is up some and she may need to increase her Celexa. She is having cataract surgery soon and needs to have a full mouth extraction due to poor dental care. Allergic rhinitis- her allergies has been flared and she is taken over-the-counter medications. Prior to Visit Medications :  Medication verapamil (CALAN SR) 120 MG extended release tablet, Sig TAKE 2 TABLETS AT BEDTIME, Taking? Yes, Authorizing Provider Chico Siegel, DO    Medication citalopram (CELEXA) 20 MG tablet, Sig Take 1 tablet by mouth daily, Taking? Yes, Authorizing Provider Chico Siegel, DO    Medication cyclobenzaprine (FLEXERIL) 10 MG tablet, Sig TAKE ONE TABLET BY MOUTH THREE TIMES A DAY AS NEEDED, Taking? Yes, Authorizing Provider Chico Siegel, DO    Medication montelukast (SINGULAIR) 10 MG tablet, Sig TAKE 1 TABLET NIGHTLY, Taking? Yes, Authorizing Provider Chico Siegel, DO    Medication lisinopril-hydrochlorothiazide (PRINZIDE;ZESTORETIC) 10-12.5 MG per tablet, Sig TAKE ONE TABLET BY MOUTH DAILY, Taking? Yes, Authorizing Provider Chico Siegel, DO    Medication fluticasone (FLONASE) 50 MCG/ACT nasal spray, Sig PLACE TWO SPRAYS IN EACH NOSTRIL ONCE DAILY, Taking? Yes, Authorizing Provider Clearance Lacrosse, APRN - CNP    Medication SPIRIVA HANDIHALER 18 MCG inhalation capsule, Sig INHALE THE CONTENTS OF 1 CAPSULE DAILY, Taking? Yes, Authorizing Provider Clearance Lacrosse, APRN - CNP    Medication oxybutynin (DITROPAN) 5 MG tablet, Sig TAKE 1 TABLET TWICE A DAY, Taking? Yes, Authorizing Provider Chico Siegel, DO    Medication naproxen (NAPROSYN) 500 MG tablet, Sig TAKE ONE TABLET BY MOUTH TWICE A DAY WITH FOOD, Taking?  Yes, Authorizing Provider Marquez Blanco, hypertension  She is to continue her medications and no added salt diet. She is to monitor her weight weekly due to the fact that she says she has lost some. Seasonal allergic rhinitis due to pollen  Continue medications as needed. Anxiety  She is to increase her Celexa to 20 mg daily. I will be seeing her in 6 to 8 weeks for a preop physical for cataracts. Other orders  -     citalopram (CELEXA) 20 MG tablet;  Take 1 tablet by mouth daily  -     cyclobenzaprine (FLEXERIL) 10 MG tablet; TAKE ONE TABLET BY MOUTH THREE TIMES A DAY AS NEEDED            Chico Siegel, DO

## 2019-07-15 RX ORDER — OXYBUTYNIN CHLORIDE 5 MG/1
TABLET ORAL
Qty: 180 TABLET | Refills: 1 | Status: SHIPPED | OUTPATIENT
Start: 2019-07-15 | End: 2019-08-16 | Stop reason: SDUPTHER

## 2019-07-15 RX ORDER — CITALOPRAM 20 MG/1
20 TABLET ORAL DAILY
Qty: 90 TABLET | Refills: 1 | Status: CANCELLED | OUTPATIENT
Start: 2019-07-15

## 2019-08-16 RX ORDER — OXYBUTYNIN CHLORIDE 5 MG/1
TABLET ORAL
Qty: 180 TABLET | Refills: 1 | Status: SHIPPED | OUTPATIENT
Start: 2019-08-16 | End: 2020-01-13

## 2019-08-16 RX ORDER — LISINOPRIL AND HYDROCHLOROTHIAZIDE 12.5; 1 MG/1; MG/1
TABLET ORAL
Qty: 90 TABLET | Refills: 0 | Status: SHIPPED | OUTPATIENT
Start: 2019-08-16 | End: 2019-11-18 | Stop reason: SDUPTHER

## 2019-08-16 NOTE — TELEPHONE ENCOUNTER
Elida Guzmán [phone] (home)    is requesting refill(s) of medication oxybutynin (DITROPAN) 5 MG     [facility]     Last OV 07/15/2019 (pertaining to medication)   Last refill 07/15/2019 (per medication requested)  Next office visit scheduled or attempted NA  Date na  If No, reason na

## 2019-08-16 NOTE — TELEPHONE ENCOUNTER
.  Last office visit 7/1/2019     Last written 2/12/19 90 tablet 1 refill    Next office visit scheduled No future OV scheduled     Requested Prescriptions     Pending Prescriptions Disp Refills    lisinopril-hydrochlorothiazide (PRINZIDE;ZESTORETIC) 10-12.5 MG per tablet [Pharmacy Med Name: LISINOPRIL-HCTZ 10-12.5 MG TAB] 90 tablet 0     Sig: TAKE ONE TABLET BY MOUTH DAILY

## 2019-08-28 ENCOUNTER — OFFICE VISIT (OUTPATIENT)
Dept: FAMILY MEDICINE CLINIC | Age: 69
End: 2019-08-28
Payer: MEDICARE

## 2019-08-28 VITALS
DIASTOLIC BLOOD PRESSURE: 60 MMHG | OXYGEN SATURATION: 90 % | HEART RATE: 90 BPM | SYSTOLIC BLOOD PRESSURE: 85 MMHG | HEIGHT: 66 IN | BODY MASS INDEX: 17.1 KG/M2 | WEIGHT: 106.4 LBS

## 2019-08-28 DIAGNOSIS — J44.9 CHRONIC OBSTRUCTIVE PULMONARY DISEASE, UNSPECIFIED COPD TYPE (HCC): ICD-10-CM

## 2019-08-28 DIAGNOSIS — Z01.818 PRE-OP EXAMINATION: ICD-10-CM

## 2019-08-28 DIAGNOSIS — H26.9 CATARACT OF LEFT EYE, UNSPECIFIED CATARACT TYPE: Primary | ICD-10-CM

## 2019-08-28 PROCEDURE — G8419 CALC BMI OUT NRM PARAM NOF/U: HCPCS | Performed by: FAMILY MEDICINE

## 2019-08-28 PROCEDURE — 4040F PNEUMOC VAC/ADMIN/RCVD: CPT | Performed by: FAMILY MEDICINE

## 2019-08-28 PROCEDURE — 1123F ACP DISCUSS/DSCN MKR DOCD: CPT | Performed by: FAMILY MEDICINE

## 2019-08-28 PROCEDURE — 3017F COLORECTAL CA SCREEN DOC REV: CPT | Performed by: FAMILY MEDICINE

## 2019-08-28 PROCEDURE — G8427 DOCREV CUR MEDS BY ELIG CLIN: HCPCS | Performed by: FAMILY MEDICINE

## 2019-08-28 PROCEDURE — 99214 OFFICE O/P EST MOD 30 MIN: CPT | Performed by: FAMILY MEDICINE

## 2019-08-28 PROCEDURE — 1036F TOBACCO NON-USER: CPT | Performed by: FAMILY MEDICINE

## 2019-08-28 PROCEDURE — 1090F PRES/ABSN URINE INCON ASSESS: CPT | Performed by: FAMILY MEDICINE

## 2019-08-28 PROCEDURE — G8400 PT W/DXA NO RESULTS DOC: HCPCS | Performed by: FAMILY MEDICINE

## 2019-08-28 PROCEDURE — G8926 SPIRO NO PERF OR DOC: HCPCS | Performed by: FAMILY MEDICINE

## 2019-08-28 PROCEDURE — 3023F SPIROM DOC REV: CPT | Performed by: FAMILY MEDICINE

## 2019-10-18 ENCOUNTER — TELEPHONE (OUTPATIENT)
Dept: FAMILY MEDICINE CLINIC | Age: 69
End: 2019-10-18

## 2019-11-18 RX ORDER — LISINOPRIL AND HYDROCHLOROTHIAZIDE 12.5; 1 MG/1; MG/1
TABLET ORAL
Qty: 90 TABLET | Refills: 1 | Status: SHIPPED | OUTPATIENT
Start: 2019-11-18 | End: 2020-05-26

## 2019-12-10 ENCOUNTER — HOSPITAL ENCOUNTER (OUTPATIENT)
Dept: CT IMAGING | Age: 69
Discharge: HOME | End: 2019-12-10
Payer: MEDICARE

## 2019-12-10 DIAGNOSIS — Z87.891 PERSONAL HISTORY OF TOBACCO USE: ICD-10-CM

## 2019-12-10 PROCEDURE — G0297 LDCT FOR LUNG CA SCREEN: HCPCS

## 2019-12-11 ENCOUNTER — OFFICE VISIT (OUTPATIENT)
Dept: PULMONOLOGY | Age: 69
End: 2019-12-11
Payer: MEDICARE

## 2019-12-11 VITALS
RESPIRATION RATE: 16 BRPM | BODY MASS INDEX: 18.32 KG/M2 | WEIGHT: 114 LBS | SYSTOLIC BLOOD PRESSURE: 124 MMHG | HEIGHT: 66 IN | OXYGEN SATURATION: 90 % | HEART RATE: 63 BPM | DIASTOLIC BLOOD PRESSURE: 70 MMHG

## 2019-12-11 DIAGNOSIS — Z91.09 ENVIRONMENTAL ALLERGIES: ICD-10-CM

## 2019-12-11 DIAGNOSIS — Z87.891 PERSONAL HISTORY OF TOBACCO USE: ICD-10-CM

## 2019-12-11 DIAGNOSIS — J44.9 COPD, SEVERE (HCC): Primary | ICD-10-CM

## 2019-12-11 PROCEDURE — 4040F PNEUMOC VAC/ADMIN/RCVD: CPT | Performed by: INTERNAL MEDICINE

## 2019-12-11 PROCEDURE — 1036F TOBACCO NON-USER: CPT | Performed by: INTERNAL MEDICINE

## 2019-12-11 PROCEDURE — G8419 CALC BMI OUT NRM PARAM NOF/U: HCPCS | Performed by: INTERNAL MEDICINE

## 2019-12-11 PROCEDURE — G8427 DOCREV CUR MEDS BY ELIG CLIN: HCPCS | Performed by: INTERNAL MEDICINE

## 2019-12-11 PROCEDURE — G8400 PT W/DXA NO RESULTS DOC: HCPCS | Performed by: INTERNAL MEDICINE

## 2019-12-11 PROCEDURE — 99214 OFFICE O/P EST MOD 30 MIN: CPT | Performed by: INTERNAL MEDICINE

## 2019-12-11 PROCEDURE — G8484 FLU IMMUNIZE NO ADMIN: HCPCS | Performed by: INTERNAL MEDICINE

## 2019-12-11 PROCEDURE — 3023F SPIROM DOC REV: CPT | Performed by: INTERNAL MEDICINE

## 2019-12-11 PROCEDURE — 3017F COLORECTAL CA SCREEN DOC REV: CPT | Performed by: INTERNAL MEDICINE

## 2019-12-11 PROCEDURE — 1123F ACP DISCUSS/DSCN MKR DOCD: CPT | Performed by: INTERNAL MEDICINE

## 2019-12-11 PROCEDURE — 1090F PRES/ABSN URINE INCON ASSESS: CPT | Performed by: INTERNAL MEDICINE

## 2019-12-11 PROCEDURE — G8926 SPIRO NO PERF OR DOC: HCPCS | Performed by: INTERNAL MEDICINE

## 2019-12-24 RX ORDER — CITALOPRAM 20 MG/1
TABLET ORAL
Qty: 90 TABLET | Refills: 0 | Status: SHIPPED | OUTPATIENT
Start: 2019-12-24 | End: 2020-03-23

## 2020-01-08 ENCOUNTER — NURSE TRIAGE (OUTPATIENT)
Dept: OTHER | Facility: CLINIC | Age: 70
End: 2020-01-08

## 2020-01-08 NOTE — TELEPHONE ENCOUNTER
Reason for Disposition   MILD difficulty breathing (e.g., minimal/no SOB at rest, SOB with walking, pulse < 100) of new onset or worse than normal    Protocols used: BREATHING DIFFICULTY-ADULT-[place]    Received call from [facility]. Pt calling c/o sob. Recently had cold and flu, was seen at The [address] [place] [address] 2 days ago and given an antibiotic and a steroid. She states she is getting a little better but is still sob with exertion. No sob at rest. Has been using her nebulizer, also. No chest pain, no fever. No history of blood clots. Recommend pt be seen in office today, call back if any new or worsening symptoms. Call soft transferred to [phone] to schedule appointment.

## 2020-01-09 ENCOUNTER — OFFICE VISIT (OUTPATIENT)
Dept: FAMILY MEDICINE CLINIC | Age: 70
End: 2020-01-09
Payer: MEDICARE

## 2020-01-09 VITALS
HEIGHT: 66 IN | WEIGHT: 114 LBS | OXYGEN SATURATION: 97 % | HEART RATE: 99 BPM | SYSTOLIC BLOOD PRESSURE: 112 MMHG | DIASTOLIC BLOOD PRESSURE: 82 MMHG | BODY MASS INDEX: 18.32 KG/M2

## 2020-01-09 PROCEDURE — 99214 OFFICE O/P EST MOD 30 MIN: CPT | Performed by: FAMILY MEDICINE

## 2020-01-09 RX ORDER — IPRATROPIUM BROMIDE AND ALBUTEROL SULFATE 2.5; .5 MG/3ML; MG/3ML
1 SOLUTION RESPIRATORY (INHALATION) EVERY 4 HOURS PRN
Qty: 360 ML | Refills: 2 | Status: SHIPPED | OUTPATIENT
Start: 2020-01-09

## 2020-01-09 RX ORDER — ACYCLOVIR 400 MG/1
400 TABLET ORAL 3 TIMES DAILY
Qty: 15 TABLET | Refills: 0 | Status: SHIPPED | OUTPATIENT
Start: 2020-01-09 | End: 2020-06-11

## 2020-01-09 ASSESSMENT — PATIENT HEALTH QUESTIONNAIRE - PHQ9
1. LITTLE INTEREST OR PLEASURE IN DOING THINGS: 1
2. FEELING DOWN, DEPRESSED OR HOPELESS: 1
SUM OF ALL RESPONSES TO PHQ QUESTIONS 1-9: 2
SUM OF ALL RESPONSES TO PHQ QUESTIONS 1-9: 2
SUM OF ALL RESPONSES TO PHQ9 QUESTIONS 1 & 2: 2

## 2020-01-09 ASSESSMENT — ENCOUNTER SYMPTOMS
SHORTNESS OF BREATH: 1
ABDOMINAL PAIN: 0
COUGH: 1
BLOOD IN STOOL: 0

## 2020-02-20 RX ORDER — MONTELUKAST SODIUM 10 MG/1
TABLET ORAL
Qty: 90 TABLET | Refills: 4 | Status: SHIPPED | OUTPATIENT
Start: 2020-02-20 | End: 2021-05-17 | Stop reason: SDUPTHER

## 2020-03-25 RX ORDER — NAPROXEN 500 MG/1
TABLET ORAL
Qty: 180 TABLET | Refills: 0 | Status: SHIPPED | OUTPATIENT
Start: 2020-03-25 | End: 2020-07-22

## 2020-03-25 NOTE — TELEPHONE ENCOUNTER
.  Last office visit 1/9/2020     Last written 7/1/19 180 with 0      Next office visit scheduled 5/5/2020    Requested Prescriptions     Pending Prescriptions Disp Refills    naproxen (NAPROSYN) 500 MG tablet [Pharmacy Med Name: NAPROXEN 500 MG TABLET] 180 tablet 0     Sig: TAKE ONE TABLET BY MOUTH TWICE A DAY AS NEEDED FOR PAIN

## 2020-04-08 RX ORDER — ALBUTEROL SULFATE 90 UG/1
2 AEROSOL, METERED RESPIRATORY (INHALATION) EVERY 6 HOURS PRN
Qty: 3 INHALER | Refills: 1 | Status: SHIPPED | OUTPATIENT
Start: 2020-04-08 | End: 2021-03-03 | Stop reason: SDUPTHER

## 2020-05-11 ENCOUNTER — TELEPHONE (OUTPATIENT)
Dept: FAMILY MEDICINE CLINIC | Age: 70
End: 2020-05-11

## 2020-05-11 RX ORDER — METHYLPREDNISOLONE 4 MG/1
TABLET ORAL
Qty: 21 TABLET | Refills: 0 | Status: SHIPPED | OUTPATIENT
Start: 2020-05-11 | End: 2020-05-17

## 2020-05-25 ENCOUNTER — TELEPHONE (OUTPATIENT)
Dept: FAMILY MEDICINE CLINIC | Age: 70
End: 2020-05-25

## 2020-05-26 RX ORDER — LISINOPRIL AND HYDROCHLOROTHIAZIDE 12.5; 1 MG/1; MG/1
TABLET ORAL
Qty: 90 TABLET | Refills: 1 | Status: SHIPPED | OUTPATIENT
Start: 2020-05-26 | End: 2020-11-30

## 2020-06-09 ENCOUNTER — TELEPHONE (OUTPATIENT)
Dept: PULMONOLOGY | Age: 70
End: 2020-06-09

## 2020-06-09 NOTE — TELEPHONE ENCOUNTER
limited to the following:    Your Provider(s) may not able to provide medical treatment for your particular condition and you may be required to seek alternative healthcare or emergency care services.  The electronic systems or other security protocols or safeguards used in the Service could fail, causing a breach of privacy of your medical or other information.  Given regulatory requirements in certain jurisdictions, your Provider(s) diagnosis and/or treatment options, especially pertaining to certain prescriptions, may be limited. Acceptance   1. You understand that Services will be provided via Telehealth. This process involves the use of HIPAA compliant and secure, real-time audio-visual interfacing with a qualified and appropriately trained provider located at [facility]. 2. You understand that, under no circumstances, will this session be recorded. 3. You understand that the Provider(s) at [facility] and other clinical participants will be party to the information obtained during the Telehealth session in accordance with best medical practices. 4. You understand that the information obtained during the Telehealth session will be used to help determine the most appropriate treatment options. 5. You understand that You have the right to revoke this consent at any point in time. 6. You understand that Telehealth is voluntary, and that continued treatment is not dependent upon consent. 7. You understand that, in the event of non-consent to Telehealth services and/or technical difficulties, you will obtain services as typically provided in the absence of Telehealth technology. 8. You understand that this consent will be kept in Your medical record. 9. No potential benefits from the use of Telehealth or specific results can be guaranteed. Your condition may not be cured or improved and, in some cases, may get worse.    10. There are limitations in the provision of medical care and treatment via Telehealth and the Service and you may not be able to receive diagnosis and/or treatment through the Service for every condition for which you seek diagnosis and/or treatment. 11. There are potential risks to the use of Telehealth, including but not limited to the risks described in this Telehealth Consent. 12. Your Provider(s) have discussed the use of Telehealth and the Service with you, including the benefits and risks of such and you have provided oral consent to your Provider(s) for the use of Telehealth and the Service. 15. You understand that it is your duty to provide your Provider(s) truthful, accurate and complete information, including all relevant information regarding care that you may have received or may be receiving from other healthcare providers outside of the Service. 14. You understand that each of your Provider(s) may determine in his or sole discretion that your condition is not suitable for diagnosis and/or treatment using the Service, and that you may need to seek medical care and treatment a specialist or other healthcare provider, outside of the Service. 15. You understand that you are fully responsible for payment for all services provided by Provider(s) or through use of the Service and that you may not be able to use third-party insurance. 16. You represent that (a) you have read this Telehealth Consent carefully, (b) you understand the risks and benefits of the Service and the use of Telehealth in the medical care and treatment provided to you by Provider(s) using the Service, and (c) you have the legal capacity and authority to provide this consent for yourself and/or the minor for which you are consenting under applicable federal and state laws, including laws relating to the age of [de-identified] and/or parental/guardian consent.    17. You give your informed consent to the use of Telehealth by Provider(s) using the Service under

## 2020-06-11 ENCOUNTER — VIRTUAL VISIT (OUTPATIENT)
Dept: PULMONOLOGY | Age: 70
End: 2020-06-11
Payer: MEDICARE

## 2020-06-11 ENCOUNTER — TELEPHONE (OUTPATIENT)
Dept: PULMONOLOGY | Age: 70
End: 2020-06-11

## 2020-06-11 PROCEDURE — 99442 PR PHYS/QHP TELEPHONE EVALUATION 11-20 MIN: CPT | Performed by: INTERNAL MEDICINE

## 2020-07-22 RX ORDER — NAPROXEN 500 MG/1
TABLET ORAL
Qty: 180 TABLET | Refills: 0 | Status: SHIPPED | OUTPATIENT
Start: 2020-07-22 | End: 2021-04-05

## 2020-07-22 NOTE — TELEPHONE ENCOUNTER
Refill Request     Last Seen: 1/9/2020    Last Written: #180  0rf  3/25/2020    Next Appointment:   Future Appointments   Date Time Provider Jarred Villagomez   9/29/2020  3:30 PM DO NEO Donahue   12/14/2020 11:00 AM [facility]   12/22/2020 11:00 AM [facility]             Requested Prescriptions     Pending Prescriptions Disp Refills    naproxen (NAPROSYN) 500 MG tablet [Pharmacy Med Name: NAPROXEN 500 MG TABLET] 180 tablet 0     Sig: TAKE ONE TABLET BY MOUTH TWICE A DAY AS NEEDED FOR PAIN

## 2020-10-14 ENCOUNTER — OFFICE VISIT (OUTPATIENT)
Dept: FAMILY MEDICINE CLINIC | Age: 70
End: 2020-10-14
Payer: MEDICARE

## 2020-10-14 VITALS
BODY MASS INDEX: 18.03 KG/M2 | SYSTOLIC BLOOD PRESSURE: 110 MMHG | DIASTOLIC BLOOD PRESSURE: 72 MMHG | HEART RATE: 76 BPM | HEIGHT: 66 IN | WEIGHT: 112.2 LBS | TEMPERATURE: 97.3 F | OXYGEN SATURATION: 98 %

## 2020-10-14 PROCEDURE — 99214 OFFICE O/P EST MOD 30 MIN: CPT | Performed by: FAMILY MEDICINE

## 2020-10-14 RX ORDER — PREDNISONE 20 MG/1
20 TABLET ORAL 2 TIMES DAILY
Qty: 12 TABLET | Refills: 0 | Status: SHIPPED | OUTPATIENT
Start: 2020-10-14 | End: 2020-10-20

## 2020-10-14 RX ORDER — BUSPIRONE HYDROCHLORIDE 5 MG/1
TABLET ORAL
Qty: 60 TABLET | Refills: 0 | Status: SHIPPED | OUTPATIENT
Start: 2020-10-14 | End: 2021-10-27 | Stop reason: SDUPTHER

## 2020-10-14 ASSESSMENT — ENCOUNTER SYMPTOMS
COUGH: 0
ABDOMINAL PAIN: 0
CONSTIPATION: 0
CHEST TIGHTNESS: 0
BLOOD IN STOOL: 0
SHORTNESS OF BREATH: 1

## 2020-10-14 NOTE — PROGRESS NOTES
Taking? Yes, Authorizing Provider Guanaco Brewer MD    Medication montelukast (SINGULAIR) 10 MG tablet, Sig TAKE 1 TABLET NIGHTLY, Taking? Yes, Authorizing Provider Chico Siegel, DO    Medication ipratropium-albuterol (DUONEB) 0.5-2.5 (3) MG/3ML SOLN nebulizer solution, Sig Inhale 3 mLs into the lungs every 4 hours as needed for Shortness of Breath, Taking? Yes, Authorizing Provider Chico Siegel, DO    Medication cyclobenzaprine (FLEXERIL) 10 MG tablet, Sig TAKE ONE TABLET BY MOUTH THREE TIMES A DAY AS NEEDED, Taking? Yes, Authorizing Provider Chico Siegel, DO    Medication fluticasone (FLONASE) 50 MCG/ACT nasal spray, Sig PLACE TWO SPRAYS IN EACH NOSTRIL ONCE DAILY, Taking? Yes, Authorizing Provider KINZA Obrien CNP    Medication SPIRIVA HANDIHALER 18 MCG inhalation capsule, Sig INHALE THE CONTENTS OF 1 CAPSULE DAILY, Taking? Yes, Authorizing Provider KINZA Obrien CNP    Medication Biotin w/ Vitamins C & E (HAIR/SKIN/NAILS PO), Sig Take by mouth daily, Taking? Yes, Authorizing Provider Heaven Sifuentes MD    Medication DiphenhydrAMINE HCl (BENADRYL ALLERGY PO), Sig Take by mouth, Taking? Yes, Authorizing Provider Heaven Sifuentes MD    Medication budesonide-formoterol (SYMBICORT) 80-4.5 MCG/ACT AERO, Sig Inhale 2 puffs into the lungs 2 times daily, Taking? Yes, Authorizing Provider Guanaco Brewer MD    Medication guaiFENesin (MUCINEX) 600 MG extended release tablet, Sig Take 1 tablet by mouth 2 times daily, Taking? Yes, Authorizing Provider Guanaco Brewer MD    Medication lidocaine (LIDODERM) 5 %, Sig Place 1 patch onto the skin daily. 12 hours on, 12 hours off., Taking? Yes, Authorizing Provider Fatoumata Siegel, DO    Medication Multiple Vitamins-Minerals (MULTIVITAMIN PO), Sig Take 1 tablet by mouth daily. , Taking? Yes, Authorizing Provider Heaven Sifuentes MD    Medication Potassium Gluconate 550 MG TABS, Sig Take 1 tablet by mouth daily. , Taking?  Yes, Authorizing Provider Historical Provider, MD      Past Medical History:  No date: Allergic rhinitis  3/12/2018: Anxiety  No date: Asthma  No date: Chronic bronchitis ([facility])  No date: COPD (chronic obstructive pulmonary disease) ([facility])  No date: Headache(784.0)  No date: Hypertension  No date: Pneumonia  3/12/2018: Primary osteoarthritis involving multiple joints  4/16/2013: Raynaud disease  No date: Tobacco abuse        Review of Systems she is due she is due for a mammogram    Review of Systems   Constitutional: Negative for fever and unexpected weight change. HENT: Negative for congestion and postnasal drip. Eyes: Negative for visual disturbance. Respiratory: Positive for shortness of breath. Negative for cough and chest tightness. Cardiovascular: Negative for chest pain, palpitations and leg swelling. Gastrointestinal: Negative for abdominal pain, blood in stool and constipation. Genitourinary: Negative for frequency and hematuria. See HPI. Musculoskeletal: Negative for arthralgias and myalgias. Skin: Negative for rash. Neurological: Negative for tremors and headaches. Psychiatric/Behavioral: Negative for sleep disturbance. The patient is nervous/anxious. Objective:   Physical Exam      Physical Exam  Constitutional:       Appearance: Normal appearance. She is well-developed and normal weight. HENT:      Head: Normocephalic. Mouth/Throat:      Mouth: Mucous membranes are moist.      Pharynx: Oropharynx is clear. Eyes:      General: No scleral icterus. Conjunctiva/sclera: Conjunctivae normal.   Neck:      Musculoskeletal: Neck supple. Thyroid: No thyromegaly. Vascular: No carotid bruit. Cardiovascular:      Rate and Rhythm: Normal rate and regular rhythm. Heart sounds: Normal heart sounds. Pulmonary:      Effort: Pulmonary effort is normal.      Comments: Moderate to severe decrease in breath sounds but clear.   Abdominal:      General: Bowel sounds are normal. There is no distension. Palpations: Abdomen is soft. There is no mass. Tenderness: There is no abdominal tenderness. Musculoskeletal:      Comments: She has discomfort with most ranges of motion but most of her discomfort is just above her left scapula. Lymphadenopathy:      Cervical: No cervical adenopathy. Skin:     General: Skin is warm and dry. Coloration: Skin is not pale. Neurological:      Mental Status: She is alert and oriented to person, place, and time. Motor: No abnormal muscle tone. Coordination: Coordination normal.   Psychiatric:         Behavior: Behavior normal.         Thought Content: Thought content normal.         Judgment: Judgment normal.         Assessment:       Diagnosis Orders   1. Essential hypertension     2. COPD, severe ([address]     3. Anxiety     4. Acute pain of left shoulder     5. Mixed stress and urge urinary incontinence  MARTA DIGITAL SCREEN W OR WO CAD BILATERAL   6. Encounter for screening mammogram for malignant neoplasm of breast   MARTA DIGITAL SCREEN W OR WO CAD BILATERAL         Plan:      Vinnie Napoles was seen today for other and discuss medications. Diagnoses and all orders for this visit:    Essential hypertension  Continue medications and no added salt diet-keep weight down and stay as active as possible  COPD, severe ([address]  Continue medications and visits with the pulmonologist when scheduled  Anxiety  Increase BuSpar-2 in the morning and 1 in the evening and call me in 2 weeks with an update  Acute pain of left shoulder  Prescription sent for prednisone 20 mg twice a day for 6 days-call me in 1 week with an update  Mixed stress and urge urinary incontinence  -     MARTA DIGITAL SCREEN W OR WO CAD BILATERAL; Future  Increase Ditropan-2 in the morning 1 in the evening  Encounter for screening mammogram for malignant neoplasm of breast   -     MARTA DIGITAL SCREEN W OR WO CAD BILATERAL;  Future  Refer for mammogram  Other orders  -     predniSONE (Theresa Limerick) 20 MG tablet; Take 1 tablet by mouth 2 times daily for 6 days  -     busPIRone (BUSPAR) 5 MG tablet; 1 daily for 5 days then 1 bid    See me 6 months.         Chico Siegel, DO

## 2020-10-14 NOTE — PATIENT INSTRUCTIONS
Essential hypertension  Continue medications and no added salt diet-keep weight down and stay as active as possible  COPD, severe ([address]  Continue medications and visits with the pulmonologist when scheduled  Anxiety  Increase BuSpar-2 in the morning and 1 in the evening and call me in 2 weeks with an update  Acute pain of left shoulder  Prescription sent for prednisone 20 mg twice a day for 6 days-call me in 1 week with an update  Mixed stress and urge urinary incontinence  -     MARTA DIGITAL SCREEN W OR WO CAD BILATERAL; Future  Increase Ditropan-2 in the morning 1 in the evening  Encounter for screening mammogram for malignant neoplasm of breast   -     MARTA DIGITAL SCREEN W OR WO CAD BILATERAL; Future  Refer for mammogram  Other orders  -     predniSONE (DELTASONE) 20 MG tablet; Take 1 tablet by mouth 2 times daily for 6 days  -     busPIRone (BUSPAR) 5 MG tablet; 1 daily for 5 days then 1 bid    See me 6 months.         Chico Siegel, DO

## 2020-10-23 RX ORDER — CYCLOBENZAPRINE HCL 10 MG
TABLET ORAL
Qty: 90 TABLET | Refills: 0 | Status: SHIPPED | OUTPATIENT
Start: 2020-10-23 | End: 2021-12-13

## 2020-10-23 NOTE — TELEPHONE ENCOUNTER
.  Last office visit 10/14/2020     Last written 7-1-19 90 with 1      Next office visit scheduled Visit date not found    Requested Prescriptions     Pending Prescriptions Disp Refills    cyclobenzaprine (FLEXERIL) 10 MG tablet [Pharmacy Med Name: CYCLOBENZAPRINE 10 MG TABLET] 90 tablet 0     Sig: TAKE ONE TABLET BY MOUTH THREE TIMES A DAY AS NEEDED

## 2020-12-22 NOTE — TELEPHONE ENCOUNTER
Refill Request     Last Seen: 10/14/2020    Last Written: #180  0rf  9/23/2020    Next Appointment:   Future Appointments   Date Time Provider Jarred Hendrixi   1/11/2021  2:30 PM MHC CT MAIN [facility] Nora Avila   1/28/2021 11:30 AM Forrest Dubin, MD Levi Noa PULM [facility]       Appointment scheduled      Requested Prescriptions     Pending Prescriptions Disp Refills    verapamil (CALAN SR) 120 MG extended release tablet [Pharmacy Med Name: VERAPAMIL HCL ER TABS 120MG] 180 tablet 3     Sig: TAKE 2 TABLETS AT BEDTIME

## 2021-01-17 ENCOUNTER — TELEPHONE (OUTPATIENT)
Dept: CASE MANAGEMENT | Age: 71
End: 2021-01-17

## 2021-01-17 NOTE — TELEPHONE ENCOUNTER
Patient due for annual CT Lung Screening. Reminder letter mailed.     Mary Swanson [address] Lung Navigator  [phone]

## 2021-01-20 ENCOUNTER — TELEPHONE (OUTPATIENT)
Dept: PULMONOLOGY | Age: 71
End: 2021-01-20

## 2021-01-20 NOTE — TELEPHONE ENCOUNTER
Spoke with pt to reschedule appointment. She does not feel comfortable going to the hospital at this time for her CT. She would like a call back in May\June to possibly reschedule.

## 2021-03-03 ENCOUNTER — IMMUNIZATION (OUTPATIENT)
Dept: PRIMARY CARE CLINIC | Age: 71
End: 2021-03-03
Payer: MEDICARE

## 2021-03-03 PROCEDURE — 0001A COVID-19, PFIZER VACCINE 30MCG/0.3ML DOSE: CPT | Performed by: FAMILY MEDICINE

## 2021-03-03 PROCEDURE — 91300 COVID-19, PFIZER VACCINE 30MCG/0.3ML DOSE: CPT | Performed by: FAMILY MEDICINE

## 2021-03-03 RX ORDER — ALBUTEROL SULFATE 90 UG/1
2 AEROSOL, METERED RESPIRATORY (INHALATION) EVERY 6 HOURS PRN
Qty: 3 INHALER | Refills: 1 | Status: SHIPPED | OUTPATIENT
Start: 2021-03-03 | End: 2022-07-22 | Stop reason: SDUPTHER

## 2021-03-08 ENCOUNTER — TELEPHONE (OUTPATIENT)
Dept: FAMILY MEDICINE CLINIC | Age: 71
End: 2021-03-08

## 2021-03-08 ENCOUNTER — VIRTUAL VISIT (OUTPATIENT)
Dept: FAMILY MEDICINE CLINIC | Age: 71
End: 2021-03-08
Payer: MEDICARE

## 2021-03-08 VITALS
BODY MASS INDEX: 18 KG/M2 | HEIGHT: 66 IN | SYSTOLIC BLOOD PRESSURE: 130 MMHG | DIASTOLIC BLOOD PRESSURE: 70 MMHG | WEIGHT: 112 LBS

## 2021-03-08 DIAGNOSIS — Z00.00 ROUTINE GENERAL MEDICAL EXAMINATION AT A HEALTH CARE FACILITY: Primary | ICD-10-CM

## 2021-03-08 PROCEDURE — G0438 PPPS, INITIAL VISIT: HCPCS | Performed by: FAMILY MEDICINE

## 2021-03-08 ASSESSMENT — LIFESTYLE VARIABLES
AUDIT TOTAL SCORE: 1
HOW MANY STANDARD DRINKS CONTAINING ALCOHOL DO YOU HAVE ON A TYPICAL DAY: 0
HOW OFTEN DURING THE LAST YEAR HAVE YOU FAILED TO DO WHAT WAS NORMALLY EXPECTED FROM YOU BECAUSE OF DRINKING: 0
HOW OFTEN DO YOU HAVE SIX OR MORE DRINKS ON ONE OCCASION: 0
HOW OFTEN DURING THE LAST YEAR HAVE YOU BEEN UNABLE TO REMEMBER WHAT HAPPENED THE NIGHT BEFORE BECAUSE YOU HAD BEEN DRINKING: 0
HOW OFTEN DURING THE LAST YEAR HAVE YOU HAD A FEELING OF GUILT OR REMORSE AFTER DRINKING: 0

## 2021-03-08 ASSESSMENT — PATIENT HEALTH QUESTIONNAIRE - PHQ9
SUM OF ALL RESPONSES TO PHQ QUESTIONS 1-9: 0
SUM OF ALL RESPONSES TO PHQ QUESTIONS 1-9: 0

## 2021-03-08 NOTE — PROGRESS NOTES
Medicare Annual Wellness Visit  Name: Bailey Lombardo Date: 3/8/2021   MRN: <[id number]> Sex: Female   Age: 72 y.o. Ethnicity: Non-/Non    : 1950 Race: Ruthie Nephew is here for Medicare AWV    Screenings for behavioral, psychosocial and functional/safety risks, and cognitive dysfunction are all negative except as indicated below. These results, as well as other patient data from the [address] form, are documented in Flowsheets linked to this Encounter. Allergies   Allergen Reactions    Acetaminophen     Codeine Other (See Comments)     Pt says she can't function on codeine    Morphine     Sulfa Antibiotics Hives       Prior to Visit Medications    Medication Sig Taking?  Authorizing Provider   albuterol sulfate  (90 Base) MCG/ACT inhaler Inhale 2 puffs into the lungs every 6 hours as needed for Wheezing  Willard Estrella MD   oxybutynin (DITROPAN) 5 MG tablet TAKE 1 TABLET TWICE A DAY  Chico Siegel DO   verapamil (CALAN SR) 120 MG extended release tablet TAKE 2 TABLETS AT BEDTIME  Chico Siegel DO   citalopram (CELEXA) 20 MG tablet TAKE 1 TABLET DAILY  Chico Siegel DO   lisinopril-hydroCHLOROthiazide (PRINZIDE;ZESTORETIC) 10-12.5 MG per tablet TAKE ONE TABLET BY MOUTH DAILY  Chico Siegel DO   cyclobenzaprine (FLEXERIL) 10 MG tablet TAKE ONE TABLET BY MOUTH THREE TIMES A DAY AS NEEDED  Soeric Eastmanos, DO   busPIRone (BUSPAR) 5 MG tablet 1 daily for 5 days then 1 bid  Chico Siegel DO   naproxen (NAPROSYN) 500 MG tablet TAKE ONE TABLET BY MOUTH TWICE A DAY AS NEEDED FOR PAIN  Chico Siegel DO   montelukast (SINGULAIR) 10 MG tablet TAKE 1 TABLET NIGHTLY  Chico Siegel DO   ipratropium-albuterol (DUONEB) 0.5-2.5 (3) MG/3ML SOLN nebulizer solution Inhale 3 mLs into the lungs every 4 hours as needed for Shortness of Breath  Chico Siegel DO   fluticasone (FLONASE) 50 MCG/ACT nasal spray PLACE TWO SPRAYS IN [facility] NOSTRIL ONCE DAILY  Moira Barahona APRN - CNP   SPIRIVA HANDIHALER 18 MCG inhalation capsule INHALE THE CONTENTS OF 1 CAPSULE DAILY  Poltanna Ko APRN - CNP   Biotin w/ Vitamins C & E (HAIR/SKIN/NAILS PO) Take by mouth daily  Historical Provider, MD   DiphenhydrAMINE HCl (BENADRYL ALLERGY PO) Take by mouth  Historical Provider, MD   budesonide-formoterol (SYMBICORT) 80-4.5 MCG/ACT AERO Inhale 2 puffs into the lungs 2 times daily  Yobani Colunga MD   guaiFENesin (MUCINEX) 600 MG extended release tablet Take 1 tablet by mouth 2 times daily  Mai Santoro MD   lidocaine (LIDODERM) 5 % Place 1 patch onto the skin daily. 12 hours on, 12 hours off. Chico Siegel DO   Multiple Vitamins-Minerals (MULTIVITAMIN PO) Take 1 tablet by mouth daily. Historical Provider, MD   Potassium Gluconate 550 MG TABS Take 1 tablet by mouth daily.   Historical Provider, MD       Past Medical History:   Diagnosis Date    Allergic rhinitis     Anxiety 3/12/2018    Asthma     Chronic bronchitis (HCC)     [facility]     Headache(784.0)     Hypertension     Pneumonia     Primary osteoarthritis involving multiple joints 3/12/2018    Raynaud disease 4/16/2013    Tobacco abuse        Past Surgical History:   Procedure Laterality Date    CATARACT REMOVAL WITH IMPLANT Bilateral 09/19/2019    CYSTOSCOPY  9/13    neg-done for incont    HYSTERECTOMY      TONSILLECTOMY         Family History   Problem Relation Age of Onset    High Blood Pressure Mother     COPD Mother     Heart Disease Father        CareTeam (Including outside providers/suppliers regularly involved in providing care):   Patient Care Team:  Rahul Jiang DO as PCP - General (Family Medicine)  Rahul Jiang DO as PCP - [facility] Empaneled Provider  Yobani Colunga MD as Consulting Physician (Pulmonology)    Wt Readings from Last 3 Encounters:   03/08/21 112 lb (50.8 kg)   10/14/20 112 lb 3.2 oz (50.9 kg)   01/09/20 114 lb (51.7 kg)     Vitals:    03/08/21 1015   BP: 130/70   Weight: 112 lb (50.8 kg)   Height: 5' 6\" (1.676 m)     Body mass index is 18.08 kg/m². Patient reports vitals. Based upon direct observation of the patient, evaluation of cognition reveals recent and remote memory intact. Patient's complete Health Risk Assessment and screening values have been reviewed and are found in Flowsheets. The following problems were reviewed today and where indicated follow up appointments were made and/or referrals ordered. Positive Risk Factor Screenings with Interventions:     Fall Risk:  2 or more falls in past year?: (!) yes  Fall with injury in past year?: no  Fall Risk Interventions:    · Home safety tips provided        General Health and ACP:  General  In general, how would you say your health is?: Good  In the past 7 days, have you experienced any of the following?  New or Increased Pain, New or Increased Fatigue, Loneliness, Social Isolation, Stress or Anger?: None of These  Do you get the social and emotional support that you need?: Yes  Do you have a Living Will?: (!) No  Advance Directives     Power of [address] [place] [address] Will ACP-Advance Directive ACP-Power of     Not on File Not on File Not on File Not on File      General Health Risk Interventions:  · No Living Will: Advance Care Planning addressed with patient today    Health Habits/Nutrition:  Health Habits/Nutrition  Do you exercise for at least 20 minutes 2-3 times per week?: (!) No  Have you lost any weight without trying in the past 3 months?: No  Do you eat only one meal per day?: No  Have you seen the dentist within the past year?: Yes  Body mass index: (!) 18.08  Health Habits/Nutrition Interventions:  · Inadequate physical activity:  patient is not ready to increase his/her physical activity level at this time  · Nutritional issues:  educational materials for healthy, well-balanced diet provided    Hearing/Vision:  No exam data present  Hearing/Vision  Do you or your family notice any Instructions/AVS.    Marion Harvey LPN, 2/7/[birth year]703, performed the documented evaluation under the direct supervision of the attending physician. This encounter was performed under Judith wisdom DOs, direct supervision, 3/8/2021. Gael Hernandez, was evaluated through a synchronous (real-time) audio-video encounter. The patient (or guardian if applicable) is aware that this is a billable service. Verbal consent to proceed has been obtained within the past 12 months. The visit was conducted pursuant to the emergency declaration under the [address] [place] [address], [address] [place] [address] authority and the [employer] General Act. Patient identification was verified, and a caregiver was present when appropriate. The patient was located in a state where the provider was credentialed to provide care. Total time spent for this encounter: Not billed by time     Location: [place]    --Isaias Ashley LPN on 8/9/9049 at 84:50 AM    An electronic signature was used to authenticate this note.

## 2021-03-08 NOTE — PATIENT INSTRUCTIONS
Personalized Preventive Plan for Kerry Neol - 3/8/2021  Medicare offers a range of preventive health benefits. Some of the tests and screenings are paid in full while other may be subject to a deductible, co-insurance, and/or copay. Some of these benefits include a comprehensive review of your medical history including lifestyle, illnesses that may run in your family, and various assessments and screenings as appropriate. After reviewing your medical record and screening and assessments performed today your provider may have ordered immunizations, labs, imaging, and/or referrals for you. A list of these orders (if applicable) as well as your Preventive Care list are included within your After Visit Summary for your review. Other Preventive Recommendations:    · A preventive eye exam performed by an eye specialist is recommended every 1-2 years to screen for glaucoma; cataracts, macular degeneration, and other eye disorders. · A preventive dental visit is recommended every 6 months. · Try to get at least 150 minutes of exercise per week or 10,000 steps per day on a pedometer . · Order or download the FREE \"Exercise & Physical Activity: Your Everyday Guide\" from [employer]. Call 7-407.767.5780 or search [employer] online. · You need 1874-2890 mg of calcium and 3469-1560 IU of vitamin D per day. It is possible to meet your calcium requirement with diet alone, but a vitamin D supplement is usually necessary to meet this goal.  · When exposed to the sun, use a sunscreen that protects against both UVA and UVB radiation with an SPF of 30 or greater. Reapply every 2 to 3 hours or after sweating, drying off with a towel, or swimming. · Always wear a seat belt when traveling in a car. Always wear a helmet when riding a bicycle or motorcycle. Learning About Living Irl Douse  What is a living will? A living will, also called a declaration, is a legal form.  It living will (also called a declaration). Give your doctor a copy of your living will. Ask him or her to keep it as part of your medical record. If you have more than one doctor, make sure that each one has a copy. Put a copy of your living will where it can be easily found. For example, some people may put a copy on their refrigerator door. If you are using a digital copy, be sure your doctor, family members, and health care agent know how to find and access it. Where can you learn more? Go to https://chpepiceweb.garbs. org and sign in to your [employer] account. Enter P146 in the [employer] box to learn more about \"Learning About Living [place]. \"     If you do not have an account, please click on the \"Sign Up Now\" link. Current as of: December 9, 2019               Content Version: 12.6  © 1079-7151 [employer]. Care instructions adapted under license by [facility] ([facility]). If you have questions about a medical condition or this instruction, always ask your healthcare professional. [address] any warranty or liability for your use of this information. ·        Learning About Medical Power of   What is a medical power of ? A medical power of , also called a durable power of  for health care, is one type of the legal forms called advance directives. It lets you name the person you want to make treatment decisions for you if you can't speak or decide for yourself. The person you choose is called your health care agent. This person is also called a health care proxy or health care surrogate. A medical power of  may be called something else in your state. How do you choose a health care agent? Choose your health care agent carefully. This person may or may not be a family member. Talk to the person before you make your final decision. Make sure he or she is comfortable with this responsibility.   It's a good idea to choose someone who:  Is at least 25years old. Knows you well and understands what makes life meaningful for you. Understands your [employer] and moral values. Will do what you want, not what he or she wants. Will be able to make difficult choices at a stressful time. Will be able to refuse or stop treatment, if that is what you would want, even if you could die. Will be firm and confident with health professionals if needed. Will ask questions to get needed information. Lives near you or agrees to travel to you if needed. Your family may help you make medical decisions while you can still be part of that process. But it's important to choose one person to be your health care agent in case you aren't able to make decisions for yourself. If you don't fill out the legal form and name a health care agent, the decisions your family can make may be limited. A health care agent may be called something else in your state. Who will make decisions for you if you don't have a health care agent? If you don't have a health care agent or a living will, you may not get the care you want. Decisions may be made by family members who disagree about your medical care. Or decisions may be made by a medical professional who doesn't know you well. In some cases, a  makes the decisions. When you name a health care agent, it is very clear who has the power to make health decisions for you. How do you name a health care agent? You name your health care agent on a legal form. This form is usually called a medical power of . Ask your hospital, state bar association, or office on aging where to find these forms. You must sign the form to make it legal. Some states require you to get the form notarized. This means that a person called a  watches you sign the form and then he or she signs the form. Some states also require that two or more witnesses sign the form.   Be sure to tell your family members and doctors who your health care agent is. Where can you learn more? Go to https://chpepiceweb.[email or web address] org and sign in to your [employer] account. Enter [id number] in the [facility] box to learn more about \"Learning About [address]. \"     If you do not have an account, please click on the \"Sign Up Now\" link. Current as of: December 9, 2019               Content Version: 12.6  © 5383-1598 [employer]. Care instructions adapted under license by [facility] ([facility]). If you have questions about a medical condition or this instruction, always ask your healthcare professional. [address] any warranty or liability for your use of this information.     ·

## 2021-03-08 NOTE — TELEPHONE ENCOUNTER
Patient reports that the antidepressant is helping some but still not a 100% she did schedule appointment for her follow up in April, also she states her arm is still really hurting and she cannot remember what test you suggested that she do to check on that please advise

## 2021-03-24 ENCOUNTER — IMMUNIZATION (OUTPATIENT)
Dept: PRIMARY CARE CLINIC | Age: 71
End: 2021-03-24
Payer: MEDICARE

## 2021-03-24 PROCEDURE — 0002A COVID-19, PFIZER VACCINE 30MCG/0.3ML DOSE: CPT | Performed by: FAMILY MEDICINE

## 2021-03-24 PROCEDURE — 91300 COVID-19, PFIZER VACCINE 30MCG/0.3ML DOSE: CPT | Performed by: FAMILY MEDICINE

## 2021-04-05 RX ORDER — NAPROXEN 500 MG/1
TABLET ORAL
Qty: 180 TABLET | Refills: 0 | Status: SHIPPED | OUTPATIENT
Start: 2021-04-05 | End: 2021-09-10

## 2021-04-05 NOTE — TELEPHONE ENCOUNTER
Last office visit 3/8/2021     Last written 7-    Next office visit scheduled 4/21/2021    Requested Prescriptions     Pending Prescriptions Disp Refills    naproxen (NAPROSYN) 500 MG tablet [Pharmacy Med Name: NAPROXEN 500 MG TABLET] 180 tablet 0     Sig: TAKE ONE TABLET BY MOUTH TWICE A DAY AS NEEDED FOR PAIN

## 2021-04-21 ENCOUNTER — TELEPHONE (OUTPATIENT)
Dept: WOMENS IMAGING | Age: 71
End: 2021-04-21

## 2021-04-21 ENCOUNTER — OFFICE VISIT (OUTPATIENT)
Dept: FAMILY MEDICINE CLINIC | Age: 71
End: 2021-04-21
Payer: MEDICARE

## 2021-04-21 ENCOUNTER — HOSPITAL ENCOUNTER (OUTPATIENT)
Dept: WOMENS IMAGING | Age: 71
Discharge: HOME | End: 2021-04-21
Payer: MEDICARE

## 2021-04-21 ENCOUNTER — TELEPHONE (OUTPATIENT)
Dept: FAMILY MEDICINE CLINIC | Age: 71
End: 2021-04-21

## 2021-04-21 VITALS
SYSTOLIC BLOOD PRESSURE: 118 MMHG | OXYGEN SATURATION: 92 % | HEIGHT: 66 IN | DIASTOLIC BLOOD PRESSURE: 78 MMHG | HEART RATE: 74 BPM | BODY MASS INDEX: 18.54 KG/M2 | WEIGHT: 115.4 LBS

## 2021-04-21 DIAGNOSIS — F41.9 ANXIETY: ICD-10-CM

## 2021-04-21 DIAGNOSIS — Z12.31 ENCOUNTER FOR SCREENING MAMMOGRAM FOR MALIGNANT NEOPLASM OF BREAST: ICD-10-CM

## 2021-04-21 DIAGNOSIS — R92.8 ABNORMAL MAMMOGRAM OF BOTH BREASTS: Primary | ICD-10-CM

## 2021-04-21 DIAGNOSIS — Z12.11 COLON CANCER SCREENING: ICD-10-CM

## 2021-04-21 DIAGNOSIS — Z13.29 THYROID DISORDER SCREEN: ICD-10-CM

## 2021-04-21 DIAGNOSIS — M19.012 LOCALIZED OSTEOARTHRITIS OF LEFT SHOULDER: ICD-10-CM

## 2021-04-21 DIAGNOSIS — M50.30 DDD (DEGENERATIVE DISC DISEASE), CERVICAL: ICD-10-CM

## 2021-04-21 DIAGNOSIS — J44.9 COPD, SEVERE (HCC): ICD-10-CM

## 2021-04-21 DIAGNOSIS — I10 ESSENTIAL HYPERTENSION: Primary | ICD-10-CM

## 2021-04-21 DIAGNOSIS — N39.46 MIXED STRESS AND URGE URINARY INCONTINENCE: ICD-10-CM

## 2021-04-21 DIAGNOSIS — E55.9 VITAMIN D DEFICIENCY: ICD-10-CM

## 2021-04-21 PROCEDURE — 99214 OFFICE O/P EST MOD 30 MIN: CPT | Performed by: FAMILY MEDICINE

## 2021-04-21 PROCEDURE — 77067 SCR MAMMO BI INCL CAD: CPT

## 2021-04-21 ASSESSMENT — ENCOUNTER SYMPTOMS
SHORTNESS OF BREATH: 1
CONSTIPATION: 0
BLOOD IN STOOL: 0
ABDOMINAL PAIN: 0
COUGH: 0
CHEST TIGHTNESS: 0

## 2021-04-21 NOTE — RESULT ENCOUNTER NOTE
Please call patient and tell her that she has a spot in both breast that need to be checked further and have her schedule.

## 2021-04-21 NOTE — PROGRESS NOTES
DAILY, Taking? Yes, Authorizing Provider Chico Siegel, DO    Medication cyclobenzaprine (FLEXERIL) 10 MG tablet, Sig TAKE ONE TABLET BY MOUTH THREE TIMES A DAY AS NEEDED, Taking? Yes, Authorizing Provider [address] [place] [address] Drive, DO    Medication busPIRone (BUSPAR) 5 MG tablet, Sig 1 daily for 5 days then 1 bid, Taking? Yes, Authorizing Provider Chico Siegel, DO    Medication montelukast (SINGULAIR) 10 MG tablet, Sig TAKE 1 TABLET NIGHTLY, Taking? Yes, Authorizing Provider Chico Siegel, DO    Medication ipratropium-albuterol (DUONEB) 0.5-2.5 (3) MG/3ML SOLN nebulizer solution, Sig Inhale 3 mLs into the lungs every 4 hours as needed for Shortness of Breath, Taking? Yes, Authorizing Provider Chico Siegel, DO    Medication fluticasone (FLONASE) 50 MCG/ACT nasal spray, Sig PLACE TWO SPRAYS IN EACH NOSTRIL ONCE DAILY, Taking? Yes, Authorizing Provider KINZA James CNP    Medication SPIRIVA HANDIHALER 18 MCG inhalation capsule, Sig INHALE THE CONTENTS OF 1 CAPSULE DAILY, Taking? Yes, Authorizing Provider KINZA James CNP    Medication Biotin w/ Vitamins C & E (HAIR/SKIN/NAILS PO), Sig Take by mouth daily, Taking? Yes, Authorizing Provider Historical Provider, MD    Medication DiphenhydrAMINE HCl (BENADRYL ALLERGY PO), Sig Take by mouth, Taking? Yes, Authorizing Provider Historical Provider, MD    Medication budesonide-formoterol (SYMBICORT) 80-4.5 MCG/ACT AERO, Sig Inhale 2 puffs into the lungs 2 times daily, Taking? Yes, Authorizing Provider Janace Schilder, MD    Medication guaiFENesin (MUCINEX) 600 MG extended release tablet, Sig Take 1 tablet by mouth 2 times daily, Taking? Yes, Authorizing Provider Janace Schilder, MD    Medication lidocaine (LIDODERM) 5 %, Sig Place 1 patch onto the skin daily. 12 hours on, 12 hours off., Taking? Yes, Authorizing Provider Esther Siegel, DO    Medication Multiple Vitamins-Minerals (MULTIVITAMIN PO), Sig Take 1 tablet by mouth daily. , Taking?  Yes, Authorizing Provider Historical Provider, MD    Medication Potassium Gluconate 550 MG TABS, Sig Take 1 tablet by mouth daily. , Taking? Yes, Authorizing Provider Historical Provider, MD      Past Medical History:  No date: Allergic rhinitis  3/12/2018: Anxiety  No date: Asthma  No date: Chronic bronchitis ([facility])  No date: COPD (chronic obstructive pulmonary disease) ([facility])  No date: Headache(784.0)  No date: Hypertension  No date: Pneumonia  3/12/2018: Primary osteoarthritis involving multiple joints  4/16/2013: Raynaud disease  No date: Tobacco abuse        Review of Systems    Review of Systems   Constitutional: Negative for fever and unexpected weight change. HENT: Negative for congestion and postnasal drip. Eyes: Negative for visual disturbance. Respiratory: Positive for shortness of breath. Negative for cough and chest tightness. Cardiovascular: Negative for chest pain, palpitations and leg swelling. Gastrointestinal: Negative for abdominal pain, blood in stool and constipation. Genitourinary: Negative for frequency and hematuria. Musculoskeletal: Positive for arthralgias. Negative for myalgias. Skin: Negative for rash. Neurological: Negative for tremors and headaches. Psychiatric/Behavioral: Negative for sleep disturbance. The patient is not nervous/anxious. Objective:   Physical Exam      Physical Exam  Constitutional:       General: She is not in acute distress. Appearance: Normal appearance. She is well-developed and normal weight. She is not ill-appearing. HENT:      Head: Normocephalic. Eyes:      General: No scleral icterus. Conjunctiva/sclera: Conjunctivae normal.   Neck:      Musculoskeletal: Neck supple. Thyroid: No thyromegaly. Vascular: No carotid bruit. Cardiovascular:      Rate and Rhythm: Normal rate and regular rhythm. Heart sounds: Normal heart sounds.    Pulmonary:      Effort: Pulmonary effort is normal.      Comments: Breath sounds very decreased but clear.  Abdominal:      General: Bowel sounds are normal. There is no distension. Palpations: Abdomen is soft. There is no mass. Tenderness: There is no abdominal tenderness. Musculoskeletal:         General: No tenderness. Right lower leg: No edema. Left lower leg: No edema. Comments: Pain left shoulder with abduction past 90 degrees-very mild weakness internal and external rotation of left shoulder   Lymphadenopathy:      Cervical: No cervical adenopathy. Skin:     General: Skin is warm and dry. Coloration: Skin is not pale. Neurological:      Mental Status: She is alert and oriented to person, place, and time. Motor: No abnormal muscle tone. Coordination: Coordination normal.   Psychiatric:         Mood and Affect: Mood normal.         Behavior: Behavior normal.         Thought Content: Thought content normal.         Judgment: Judgment normal.         Assessment:       Diagnosis Orders   1. Essential hypertension  CBC Auto Differential    Comprehensive Metabolic Panel    Lipid Panel   2. COPD, severe ([address]     3. Anxiety     4. Mixed stress and urge urinary incontinence     5. Encounter for screening mammogram for malignant neoplasm of breast  [facility] DIGITAL SCREEN W OR WO CAD BILATERAL   6. Colon cancer screening  Cologuard (For External Results Only)   7. Vitamin D deficiency  Vitamin D 25 Hydroxy   8. Thyroid disorder screen  TSH without Reflex   9. DDD (degenerative disc disease), cervical     10. Localized osteoarthritis of left shoulder           Plan:      Norma Garcia was seen today for 6 month follow-up. Diagnoses and all orders for this visit:    Essential hypertension  -     CBC Auto Differential  -     Comprehensive Metabolic Panel  -     Lipid Panel  Continue medications and no added salt diet-check blood pressure once or twice a month and notify me if any persistent elevations.   COPD, severe ([address]  Continue medications and go ahead and schedule your low-dose CT scan prior to June. Anxiety  Continue medications as needed and notify me if any increase in anxiety. Mixed stress and urge urinary incontinence  Continue medications and notify me if any increase in incontinence  Encounter for screening mammogram for malignant neoplasm of breast  -     [facility] DIGITAL SCREEN W OR WO CAD BILATERAL  Refer for mammogram  Colon cancer screening  -     Cologuard (For External Results Only); Future  Refer for colon  Vitamin D deficiency  -     Vitamin D 25 Hydroxy  Lab now  Thyroid disorder screen  -     TSH without Reflex  Lab now  DDD (degenerative disc disease), cervical  Consider referral to orthopedics  Localized osteoarthritis of left shoulder  Discussed referral to orthopedics for possible cortisone injection. This is been a 30 to 35-minute visit with the patient.   Prior to and during the visit chart was reviewed for immunizations, colonoscopies, mammograms and labs    See me 6 months     Chico Siegel DO

## 2021-04-21 NOTE — TELEPHONE ENCOUNTER
She needs to see the podiatry group up the street from our office--tell her to call  and to see anyone in the group that is available.

## 2021-04-22 ENCOUNTER — TELEPHONE (OUTPATIENT)
Dept: WOMENS IMAGING | Age: 71
End: 2021-04-22

## 2021-04-22 LAB
A/G RATIO: 2 (ref 1.1–2.2)
ALBUMIN SERPL-MCNC: 4.2 G/DL (ref 3.4–5)
ALP BLD-CCNC: 83 U/L (ref 40–129)
ALT SERPL-CCNC: 14 U/L (ref 10–40)
ANION GAP SERPL CALCULATED.3IONS-SCNC: 11 MMOL/L (ref 3–16)
AST SERPL-CCNC: 20 U/L (ref 15–37)
BASOPHILS ABSOLUTE: 0.1 K/UL (ref 0–0.2)
BASOPHILS RELATIVE PERCENT: 1 %
BILIRUB SERPL-MCNC: <0.2 MG/DL (ref 0–1)
BUN BLDV-MCNC: 16 MG/DL (ref 7–20)
CALCIUM SERPL-MCNC: 9.2 MG/DL (ref 8.3–10.6)
CHLORIDE BLD-SCNC: 103 MMOL/L (ref 99–110)
CHOLESTEROL, TOTAL: 193 MG/DL (ref 0–199)
CO2: 30 MMOL/L (ref 21–32)
CREAT SERPL-MCNC: 0.7 MG/DL (ref 0.6–1.2)
EOSINOPHILS ABSOLUTE: 0.3 K/UL (ref 0–0.6)
EOSINOPHILS RELATIVE PERCENT: 2.9 %
GFR AFRICAN AMERICAN: >60
GFR NON-AFRICAN AMERICAN: >60
GLOBULIN: 2.1 G/DL
GLUCOSE BLD-MCNC: 88 MG/DL (ref 70–99)
HCT VFR BLD CALC: 37.5 % (ref 36–48)
HDLC SERPL-MCNC: 50 MG/DL (ref 40–60)
HEMOGLOBIN: 12.5 G/DL (ref 12–16)
LDL CHOLESTEROL CALCULATED: 110 MG/DL
LYMPHOCYTES ABSOLUTE: 1.4 K/UL (ref 1–5.1)
LYMPHOCYTES RELATIVE PERCENT: 15.7 %
MCH RBC QN AUTO: 30.3 PG (ref 26–34)
MCHC RBC AUTO-ENTMCNC: 33.4 G/DL (ref 31–36)
MCV RBC AUTO: 90.6 FL (ref 80–100)
MONOCYTES ABSOLUTE: 0.7 K/UL (ref 0–1.3)
MONOCYTES RELATIVE PERCENT: 7.8 %
NEUTROPHILS ABSOLUTE: 6.5 K/UL (ref 1.7–7.7)
NEUTROPHILS RELATIVE PERCENT: 72.6 %
PDW BLD-RTO: 13.2 % (ref 12.4–15.4)
PLATELET # BLD: 414 K/UL (ref 135–450)
PMV BLD AUTO: 8.7 FL (ref 5–10.5)
POTASSIUM SERPL-SCNC: 4.6 MMOL/L (ref 3.5–5.1)
RBC # BLD: 4.14 M/UL (ref 4–5.2)
SODIUM BLD-SCNC: 144 MMOL/L (ref 136–145)
TOTAL PROTEIN: 6.3 G/DL (ref 6.4–8.2)
TRIGL SERPL-MCNC: 163 MG/DL (ref 0–150)
TSH SERPL DL<=0.05 MIU/L-ACNC: 1.24 UIU/ML (ref 0.27–4.2)
VITAMIN D 25-HYDROXY: 34.9 NG/ML
VLDLC SERPL CALC-MCNC: 33 MG/DL
WBC # BLD: 8.9 K/UL (ref 4–11)

## 2021-05-17 RX ORDER — MONTELUKAST SODIUM 10 MG/1
TABLET ORAL
Qty: 90 TABLET | Refills: 3 | Status: SHIPPED | OUTPATIENT
Start: 2021-05-17 | End: 2022-05-10

## 2021-05-17 NOTE — TELEPHONE ENCOUNTER
Last office visit 4/21/2021     Last written 2-0-20 x 4 refills    Next office visit scheduled 10/21/2021    Requested Prescriptions     Pending Prescriptions Disp Refills    montelukast (SINGULAIR) 10 MG tablet 90 tablet 4     Sig: TAKE 1 TABLET NIGHTLY

## 2021-06-09 NOTE — RESULT ENCOUNTER NOTE
Please call her and see if she has scheduled her additional test on her breasts-very important-let me know.

## 2021-09-10 RX ORDER — NAPROXEN 500 MG/1
TABLET ORAL
Qty: 180 TABLET | Refills: 0 | Status: SHIPPED | OUTPATIENT
Start: 2021-09-10 | End: 2022-03-28 | Stop reason: SDUPTHER

## 2021-09-13 DIAGNOSIS — J43.9 PULMONARY EMPHYSEMA, UNSPECIFIED EMPHYSEMA TYPE (HCC): ICD-10-CM

## 2021-09-13 RX ORDER — TIOTROPIUM BROMIDE 18 UG/1
CAPSULE ORAL; RESPIRATORY (INHALATION)
Qty: 90 CAPSULE | Refills: 0 | Status: SHIPPED | OUTPATIENT
Start: 2021-09-13 | End: 2021-12-27 | Stop reason: SDUPTHER

## 2021-10-26 ENCOUNTER — TELEPHONE (OUTPATIENT)
Dept: PULMONOLOGY | Age: 71
End: 2021-10-26

## 2021-10-26 ENCOUNTER — OFFICE VISIT (OUTPATIENT)
Dept: PULMONOLOGY | Age: 71
End: 2021-10-26
Payer: MEDICARE

## 2021-10-26 VITALS
HEIGHT: 66 IN | OXYGEN SATURATION: 94 % | SYSTOLIC BLOOD PRESSURE: 128 MMHG | DIASTOLIC BLOOD PRESSURE: 82 MMHG | BODY MASS INDEX: 17.81 KG/M2 | RESPIRATION RATE: 20 BRPM | HEART RATE: 128 BPM | WEIGHT: 110.8 LBS

## 2021-10-26 DIAGNOSIS — J44.9 COPD, SEVERE (HCC): ICD-10-CM

## 2021-10-26 DIAGNOSIS — Z87.891 PERSONAL HISTORY OF TOBACCO USE: Primary | ICD-10-CM

## 2021-10-26 PROCEDURE — G0296 VISIT TO DETERM LDCT ELIG: HCPCS | Performed by: INTERNAL MEDICINE

## 2021-10-26 PROCEDURE — 99214 OFFICE O/P EST MOD 30 MIN: CPT | Performed by: INTERNAL MEDICINE

## 2021-10-26 RX ORDER — ALBUTEROL SULFATE 90 UG/1
2 AEROSOL, METERED RESPIRATORY (INHALATION) EVERY 6 HOURS PRN
Qty: 18 G | Refills: 6 | Status: SHIPPED | OUTPATIENT
Start: 2021-10-26 | End: 2021-12-27 | Stop reason: SDUPTHER

## 2021-10-26 NOTE — PROGRESS NOTES
Low Dose CT (LDCT) Lung Screening criteria met:     Age 55-77(Medicare) or 50-80 ([facility])   Pack year smoking >30 (Medicare) or >20 ([facility])   Still smoking or less than 15 year since quit   No sign or symptoms of lung cancer   > 11 months since last LDCT     Risks and benefits of lung cancer screening with LDCT scans discussed:    Significance of positive screen - False-positive LDCT results often occur. 95% of all positive results do not lead to a diagnosis of cancer. Usually further imaging can resolve most false-positive results; however, some patients may require invasive procedures. Over diagnosis risk - 10% to 12% of screen-detected lung cancer cases are over diagnosedthat is, the cancer would not have been detected in the patient's lifetime without the screening. Need for follow up screens annually to continue lung cancer screening effectiveness     Risks associated with radiation from annual LDCT- Radiation exposure is about the same as for a mammogram, which is about 1/3 of the annual background radiation exposure from everyday life. Starting screening at age 54 is not likely to increase cancer risk from radiation exposure. Patients with comorbidities resulting in life expectancy of < 10 years, or that would preclude treatment of an abnormality identified on CT, should not be screened due to lack of benefit.     To obtain maximal benefit from this screening, smoking cessation and long-term abstinence from smoking is critical

## 2021-10-26 NOTE — PROGRESS NOTES
[facility]    Outpatient Follow Up Note    CC: COPD  Consulting provider: Dr. Narciso Pizarro    Interval History: 80 y.o. female  Dyspnea on exertion is unchanged. C/o increased cough that improves with benadryl. She has not been wheezing. Compliant with inhalers. Initial HPI: referred for COPD. Does not use inhaler daily. Does wheeze every day and has dry cough daily. Uses PRN inhaler less than once per week. Uses advair less than once per week. She waits until symptoms are flared and then stars using inhalers when she can hardly breath. She states she has some environmental/seasonal allergies that cause wheezing and itchy eyes.    Smoked since age 9 at 1 PPD  Current Medications:    Current Outpatient Medications:     tiotropium (SPIRIVA HANDIHALER) 18 MCG inhalation capsule, INHALE THE CONTENTS OF 1 CAPSULE DAILY, Disp: 90 capsule, Rfl: 0    naproxen (NAPROSYN) 500 MG tablet, TAKE ONE TABLET BY MOUTH TWO TIMES A DAY AS NEEDED FOR PAIN, Disp: 180 tablet, Rfl: 0    citalopram (CELEXA) 20 MG tablet, TAKE 1 TABLET DAILY, Disp: 90 tablet, Rfl: 0    oxybutynin (DITROPAN) 5 MG tablet, TAKE 1 TABLET TWICE A DAY, Disp: 180 tablet, Rfl: 1    lisinopril-hydroCHLOROthiazide (PRINZIDE;ZESTORETIC) 10-12.5 MG per tablet, TAKE ONE TABLET BY MOUTH DAILY, Disp: 90 tablet, Rfl: 1    montelukast (SINGULAIR) 10 MG tablet, TAKE 1 TABLET NIGHTLY, Disp: 90 tablet, Rfl: 3    albuterol sulfate  (90 Base) MCG/ACT inhaler, Inhale 2 puffs into the lungs every 6 hours as needed for Wheezing, Disp: 3 Inhaler, Rfl: 1    verapamil (CALAN SR) 120 MG extended release tablet, TAKE 2 TABLETS AT BEDTIME, Disp: 180 tablet, Rfl: 3    cyclobenzaprine (FLEXERIL) 10 MG tablet, TAKE ONE TABLET BY MOUTH THREE TIMES A DAY AS NEEDED, Disp: 90 tablet, Rfl: 0    busPIRone (BUSPAR) 5 MG tablet, 1 daily for 5 days then 1 bid, Disp: 60 tablet, Rfl: 0    fluticasone (FLONASE) 50 MCG/ACT nasal spray, PLACE TWO SPRAYS IN EACH NOSTRIL ONCE DAILY, Disp: 1 Bottle, Rfl: 5    DiphenhydrAMINE HCl (BENADRYL ALLERGY PO), Take by mouth, Disp: , Rfl:     budesonide-formoterol (SYMBICORT) 80-4.5 MCG/ACT AERO, Inhale 2 puffs into the lungs 2 times daily, Disp: 1 Inhaler, Rfl: 5    guaiFENesin (MUCINEX) 600 MG extended release tablet, Take 1 tablet by mouth 2 times daily, Disp: 60 tablet, Rfl: 3    lidocaine (LIDODERM) 5 %, Place 1 patch onto the skin daily. 12 hours on, 12 hours off., Disp: 90 patch, Rfl: 0    Multiple Vitamins-Minerals (MULTIVITAMIN PO), Take 1 tablet by mouth daily. , Disp: , Rfl:     Potassium Gluconate 550 MG TABS, Take 1 tablet by mouth daily. , Disp: , Rfl:     ipratropium-albuterol (DUONEB) 0.5-2.5 (3) MG/3ML SOLN nebulizer solution, Inhale 3 mLs into the lungs every 4 hours as needed for Shortness of Breath (Patient not taking: Reported on 10/26/2021), Disp: 360 mL, Rfl: 2    Biotin w/ Vitamins C & E (HAIR/SKIN/NAILS PO), Take by mouth daily (Patient not taking: Reported on 10/26/2021), Disp: , Rfl:     Objective:   PHYSICAL EXAM:    /82   Pulse 128   Resp 20   Ht 5' 6\" (1.676 m)   Wt 110 lb 12.8 oz (50.3 kg)   SpO2 94% Comment: 2L O2  BMI 17.88 kg/m²   Constitutional: In no acute distress. Appears stated age. Well developed and nourished  Eyes: PERRL. No sclera icterus. No conjunctival injection. ENT: Oropharynx with mild erythema. Neck: Trachea midline. No thyroid tenderness. Lymph: No cervical LAD. No supraclavicular LAD. Resp: No accessory muscle use. No crackles. No  wheezes. No rhonchi. CV: Regular rate. Regular rhythm. No murmur or rub. No lower extremity edema. Psych: Oriented x 3. Mood and affect normal. Intact judgment and insight. LABS:  Reviewed any pertinent new labs that are available.     PFTs   8/16/14 FVC  (%) FEV1  1.11 L(42%) FEV1/FVC ratio  52  TLC  (85%)  RV  (122%)   DLCO (46%) Bronchodilator response:no  5/9/17 FVC  (77%) FEV1  1.33 L(51%) FEV1/FVC ratio  51  TLC (92%)  RV  (128%)   DLCO (46%) Bronchodilator response:no    6MWT: 720ft    IMAGING:  I personally reviewed and interpreted the following today in the office:   12/10/19 LDCT Chest : emphysema, few 3mm nodules    ASSESSMENT:   Severe COPD   Seasonal/environmental allergies   Allergic Rhinitis   Tobacco abuse - quit 1/2017  Over 30 pack year smoking history  Anxiety and depression    PLAN:   Rx for PRN albuterol   Continue Spiriva and symbicort  Continue PRN albuterol MDI/nebs  Continue Singulair  Claritin and Flonase  Pulm rehab declined  Return for COPD in 6 mo and LDCT and cancellation list  Screening CT scan was considered in a lung cancer screening counseling and shared decision making visit today that included the following elements:   Eligibility: Age:71. There are no signs or symptoms of lung cancer.   Tobacco History 55 pack-years, quit 4 years ago in 2017  Verbal counseling has been performed by me to include benefits and harms of screening, follow-up diagnostic testing, over-diagnosis, false positive rate, and total radiation exposure;   I have counseled on the importance of adherence to annual lung cancer LDCT screening, the impact of comorbidities and patient is willing to undergo diagnosis and treatment;   I have provided counseling on the importance of maintaining cigarette smoking abstinence if former smoker; or the importance of smoking cessation if current smoker and, if appropriate, furnishing of information about tobacco cessation intervention

## 2021-10-26 NOTE — TELEPHONE ENCOUNTER
shelbie for ct lung screen 11/3/21 needs shelbie for f/u. Watch for cancellation. 6MW same day @ 1pm ok per Alexandrea. Watch for results does not have O2 at home. LM for patient to call to inform of walk test that was added to her Ct screen.

## 2021-10-27 ENCOUNTER — OFFICE VISIT (OUTPATIENT)
Dept: FAMILY MEDICINE CLINIC | Age: 71
End: 2021-10-27
Payer: MEDICARE

## 2021-10-27 VITALS
HEART RATE: 92 BPM | BODY MASS INDEX: 17.92 KG/M2 | WEIGHT: 111 LBS | SYSTOLIC BLOOD PRESSURE: 118 MMHG | OXYGEN SATURATION: 90 % | DIASTOLIC BLOOD PRESSURE: 76 MMHG

## 2021-10-27 DIAGNOSIS — J44.9 COPD, SEVERE (HCC): ICD-10-CM

## 2021-10-27 DIAGNOSIS — F41.9 ANXIETY: ICD-10-CM

## 2021-10-27 DIAGNOSIS — I10 ESSENTIAL HYPERTENSION: Primary | ICD-10-CM

## 2021-10-27 DIAGNOSIS — Z23 NEED FOR INFLUENZA VACCINATION: ICD-10-CM

## 2021-10-27 PROBLEM — J43.9 PULMONARY EMPHYSEMA (HCC): Status: RESOLVED | Noted: 2017-08-09 | Resolved: 2021-10-27

## 2021-10-27 PROBLEM — J18.9 PNEUMONIA: Status: RESOLVED | Noted: 2017-01-12 | Resolved: 2021-10-27

## 2021-10-27 PROCEDURE — 90694 VACC AIIV4 NO PRSRV 0.5ML IM: CPT | Performed by: NURSE PRACTITIONER

## 2021-10-27 PROCEDURE — G0008 ADMIN INFLUENZA VIRUS VAC: HCPCS | Performed by: NURSE PRACTITIONER

## 2021-10-27 PROCEDURE — 99213 OFFICE O/P EST LOW 20 MIN: CPT | Performed by: NURSE PRACTITIONER

## 2021-10-27 RX ORDER — BUSPIRONE HYDROCHLORIDE 5 MG/1
TABLET ORAL
Qty: 90 TABLET | Refills: 1 | Status: SHIPPED | OUTPATIENT
Start: 2021-10-27

## 2021-10-27 SDOH — ECONOMIC STABILITY: HOUSING INSECURITY
IN THE LAST 12 MONTHS, WAS THERE A TIME WHEN YOU DID NOT HAVE A STEADY PLACE TO SLEEP OR SLEPT IN A SHELTER (INCLUDING NOW)?: NO

## 2021-10-27 SDOH — ECONOMIC STABILITY: TRANSPORTATION INSECURITY
IN THE PAST 12 MONTHS, HAS THE LACK OF TRANSPORTATION KEPT YOU FROM MEDICAL APPOINTMENTS OR FROM GETTING MEDICATIONS?: NO

## 2021-10-27 SDOH — ECONOMIC STABILITY: FOOD INSECURITY: WITHIN THE PAST 12 MONTHS, THE FOOD YOU BOUGHT JUST DIDN'T LAST AND YOU DIDN'T HAVE MONEY TO GET MORE.: NEVER TRUE

## 2021-10-27 SDOH — ECONOMIC STABILITY: TRANSPORTATION INSECURITY
IN THE PAST 12 MONTHS, HAS LACK OF TRANSPORTATION KEPT YOU FROM MEETINGS, WORK, OR FROM GETTING THINGS NEEDED FOR DAILY LIVING?: NO

## 2021-10-27 SDOH — ECONOMIC STABILITY: INCOME INSECURITY: IN THE LAST 12 MONTHS, WAS THERE A TIME WHEN YOU WERE NOT ABLE TO PAY THE MORTGAGE OR RENT ON TIME?: NO

## 2021-10-27 SDOH — ECONOMIC STABILITY: FOOD INSECURITY: WITHIN THE PAST 12 MONTHS, YOU WORRIED THAT YOUR FOOD WOULD RUN OUT BEFORE YOU GOT MONEY TO BUY MORE.: NEVER TRUE

## 2021-10-27 ASSESSMENT — ENCOUNTER SYMPTOMS
CHEST TIGHTNESS: 0
STRIDOR: 0
COUGH: 0
GASTROINTESTINAL NEGATIVE: 1
APNEA: 0
WHEEZING: 0
SHORTNESS OF BREATH: 1
BACK PAIN: 0
CHOKING: 0

## 2021-10-27 ASSESSMENT — SOCIAL DETERMINANTS OF HEALTH (SDOH): HOW HARD IS IT FOR YOU TO PAY FOR THE VERY BASICS LIKE FOOD, HOUSING, MEDICAL CARE, AND HEATING?: NOT HARD AT ALL

## 2021-10-27 NOTE — PROGRESS NOTES
10/27/2021    Paula Marie (:  1950) is a 80 y.o. female, here for a preventive medicine evaluation. Here for 6 month f/u for HTN. Taking medication as prescribed. BP stable. Labs from 2021 rev'd. Still needs to do Cologuard test.    Would like to get flu vaccine today. Patient Active Problem List   Diagnosis    Low back pain    Essential hypertension    Cervical osteoarthritis    Urinary incontinence, mixed    Raynaud disease    Allergic rhinitis    Foot pain, bilateral    Environmental allergies    COPD, severe ([address]    Primary osteoarthritis involving multiple joints    Anxiety       Review of Systems   Constitutional: Negative. Respiratory: Positive for shortness of breath. Negative for apnea, cough, choking, chest tightness, wheezing and stridor. Chronic SOB - h/o COPD   Cardiovascular: Negative. Gastrointestinal: Negative. Musculoskeletal: Positive for arthralgias. Negative for back pain, gait problem, joint swelling, myalgias, neck pain and neck stiffness. Skin: Negative. Neurological: Negative. Psychiatric/Behavioral: Negative. Prior to Visit Medications    Medication Sig Taking?  Authorizing Provider   busPIRone (BUSPAR) 5 MG tablet 1 daily for 5 days then 1 bid Yes KINZA Parrish - CNP   albuterol sulfate HFA (VENTOLIN HFA) 108 (90 Base) MCG/ACT inhaler Inhale 2 puffs into the lungs every 6 hours as needed for Wheezing or Shortness of Breath Yes Peggy Cortez MD   tiotropium (SPIRIVA HANDIHALER) 18 MCG inhalation capsule INHALE THE CONTENTS OF 1 CAPSULE DAILY Yes Carolina Kapoor PA-C   naproxen (NAPROSYN) 500 MG tablet TAKE ONE TABLET BY MOUTH TWO TIMES A DAY AS NEEDED FOR PAIN Yes Zita Shell, DO   citalopram (CELEXA) 20 MG tablet TAKE 1 TABLET DAILY Yes Chico Siegel, DO   oxybutynin (DITROPAN) 5 MG tablet TAKE 1 TABLET TWICE A DAY Yes Chico Siegel, DO   lisinopril-hydroCHLOROthiazide (PRINZIDE;ZESTORETIC) 10-12.5 MG per tablet TAKE ONE TABLET BY MOUTH DAILY Yes Chico Siegel DO   montelukast (SINGULAIR) 10 MG tablet TAKE 1 TABLET NIGHTLY Yes Chico Siegel DO   albuterol sulfate  (90 Base) MCG/ACT inhaler Inhale 2 puffs into the lungs every 6 hours as needed for Wheezing Yes Kitty Lerner MD   verapamil (CALAN SR) 120 MG extended release tablet TAKE 2 TABLETS AT BEDTIME Yes Chico Siegel DO   cyclobenzaprine (FLEXERIL) 10 MG tablet TAKE ONE TABLET BY MOUTH THREE TIMES A DAY AS NEEDED Yes Shamika Teresa DO   ipratropium-albuterol (DUONEB) 0.5-2.5 (3) MG/3ML SOLN nebulizer solution Inhale 3 mLs into the lungs every 4 hours as needed for Shortness of Breath Yes Chico Siegel DO   fluticasone (FLONASE) 50 MCG/ACT nasal spray PLACE TWO SPRAYS IN EACH NOSTRIL ONCE DAILY Yes KINZA Rogers - CNP   Biotin w/ Vitamins C & E (HAIR/SKIN/NAILS PO) Take by mouth daily  Yes Historical Provider, MD   DiphenhydrAMINE HCl (BENADRYL ALLERGY PO) Take by mouth Yes Historical Provider, MD   budesonide-formoterol (SYMBICORT) 80-4.5 MCG/ACT AERO Inhale 2 puffs into the lungs 2 times daily Yes Kitty Lerner MD   guaiFENesin (MUCINEX) 600 MG extended release tablet Take 1 tablet by mouth 2 times daily Yes Kitty Lerner MD   Multiple Vitamins-Minerals (MULTIVITAMIN PO) Take 1 tablet by mouth daily. Yes Historical Provider, MD   lidocaine (LIDODERM) 5 % Place 1 patch onto the skin daily. 12 hours on, 12 hours off. Patient not taking: Reported on 10/27/2021  Chico Siegel DO   Potassium Gluconate 550 MG TABS Take 1 tablet by mouth daily.   Historical Provider, MD        Allergies   Allergen Reactions    Acetaminophen     Codeine Other (See Comments)     Pt says she can't function on codeine    Morphine     Sulfa Antibiotics Hives       Past Medical History:   Diagnosis Date    Allergic rhinitis     Anxiety 3/12/2018    Asthma     Chronic bronchitis (HCC)     COPD (chronic obstructive pulmonary disease) [facility]     Headache(784.0)     Hypertension     Pneumonia     Primary osteoarthritis involving multiple joints 3/12/2018    Raynaud disease 2013    Tobacco abuse        Past Surgical History:   Procedure Laterality Date    CATARACT REMOVAL WITH IMPLANT Bilateral 2019    CYSTOSCOPY      neg-done for incont    HYSTERECTOMY      TONSILLECTOMY         Social History     Socioeconomic History    Marital status:      Spouse name: Not on file    Number of children: Not on file    Years of education: Not on file    Highest education level: Not on file   Occupational History    Not on file   Tobacco Use    Smoking status: Former Smoker     Packs/day: 1.00     Years: 30.00     Pack years: 30.00     Types: Cigarettes     Quit date: 2017     Years since quittin.7    Smokeless tobacco: Never Used   Vaping Use    Vaping Use: Never used   Substance and Sexual Activity    Alcohol use: No     Alcohol/week: 0.0 standard drinks    Drug use: No    Sexual activity: Never   Other Topics Concern    Not on file   Social History Narrative    Not on file     Social Determinants of Health     Financial Resource Strain: Low Risk     Difficulty of Paying Living Expenses: Not hard at all   Food Insecurity: No Food Insecurity    Worried About [address] [place] [address] in the Last Year: Never true    [employer] in the Last Year: Never true   Transportation Needs: No Transportation Needs    Lack of Transportation (Medical): No    Lack of Transportation (Non-Medical):  No   Physical Activity:     Days of Exercise per Week:     Minutes of Exercise per Session:    Stress:     Feeling of Stress :    Social Connections:     Frequency of Communication with Friends and Family:     Frequency of Social Gatherings with Friends and Family:     Attends Oriental orthodox Services:     Active Member of Clubs or Organizations:     Attends Club or Organization Meetings:     Marital Status:    Intimate Partner Violence:  Fear of Current or Ex-Partner:     Emotionally Abused:     Physically Abused:     Sexually Abused:         Family History   Problem Relation Age of Onset    High Blood Pressure Mother     COPD Mother     Heart Disease Father        ADVANCE DIRECTIVE: N, <no information>    Vitals:    10/27/21 1147   BP: 118/76   Site: Right Upper Arm   Position: Sitting   Cuff Size: Medium Adult   Pulse: 92   SpO2: 90%   Weight: 111 lb (50.3 kg)     Estimated body mass index is 17.92 kg/m² as calculated from the following:    Height as of 10/26/21: 5' 6\" (1.676 m). Weight as of this encounter: 111 lb (50.3 kg). Physical Exam  Vitals reviewed. Constitutional:       Appearance: Normal appearance. HENT:      Head: Normocephalic. Neck:      Vascular: No carotid bruit. Cardiovascular:      Rate and Rhythm: Normal rate and regular rhythm. Pulses: Normal pulses. Heart sounds: Normal heart sounds. Pulmonary:      Effort: Pulmonary effort is normal.      Breath sounds: Normal breath sounds. Musculoskeletal:      Cervical back: Normal range of motion. Skin:     General: Skin is warm and dry. Neurological:      Mental Status: She is alert and oriented to person, place, and time. Psychiatric:         Mood and Affect: Mood normal.         Behavior: Behavior normal.         Thought Content: Thought content normal.         Judgment: Judgment normal.         No flowsheet data found.     Lab Results   Component Value Date    CHOL 193 04/21/2021    CHOL 213 02/06/2015    CHOL 186 04/16/2013    TRIG 163 04/21/2021    TRIG 188 02/06/2015    TRIG 140 04/16/2013    HDL 50 04/21/2021    HDL 54 02/06/2015    HDL 63 04/16/2013    HDL 65 06/14/2011    LDLCALC 110 04/21/2021    LDLCALC 121 02/06/2015    LDLCALC 95 04/16/2013    GLUCOSE 88 04/21/2021       The 10-year ASCVD risk score (Anita Almonte et al., 2013) is: 10.9%    Values used to calculate the score:      Age: 80 years      Sex: Female      Is Non- : No      Diabetic: No      Tobacco smoker: No      Systolic Blood Pressure: 942 mmHg      Is BP treated: Yes      HDL Cholesterol: 50 mg/dL      Total Cholesterol: 193 mg/dL    Immunization History   Administered Date(s) Administered    COVID-19, Pfizer, PF, 30mcg/0.3mL 03/03/2021, 03/24/2021    Influenza Vaccine, unspecified formulation 10/21/2013    Influenza Virus Vaccine 10/29/2014    Influenza Whole 10/29/2014    Influenza, High Dose (Fluzone 65 yrs and older) 10/21/2013, 12/12/2017    Influenza, High-dose, Quadv, 65 yrs +, IM (Fluzone) 10/29/2020    Influenza, Quadv, IM, PF (6 mo and older Fluzone, Flulaval, Fluarix, and 3 yrs and older Afluria) 01/14/2017    Pneumococcal Conjugate 13-valent ([id number]) 01/16/2017    Pneumococcal Polysaccharide ([id number]) 12/12/2017       Health Maintenance   Topic Date Due    Hepatitis C screen  Never done    DTaP/Tdap/Td vaccine (1 - Tdap) Never done    Colon cancer screen colonoscopy  Never done    Shingles Vaccine (1 of 2) Never done    DEXA (modify frequency per FRAX score)  Never done    Low dose CT lung screening  12/10/2020    Flu vaccine (1) 09/01/2021    COVID-19 Vaccine (3 - Pfizer booster) 09/24/2021    Annual Wellness Visit (AWV)  03/09/2022    Potassium monitoring  04/21/2022    Creatinine monitoring  04/21/2022    Breast cancer screen  04/21/2023    Lipid screen  04/21/2026    Pneumococcal 65+ years Vaccine  Completed    Hepatitis A vaccine  Aged Out    Hepatitis B vaccine  Aged Out    Hib vaccine  Aged Out    Meningococcal (ACWY) vaccine  Aged Out          ASSESSMENT/PLAN:    1. Essential hypertension  Stable. Continue medication as prescribed. 2. Anxiety  Stable continue medication as prescribed. -     busPIRone (BUSPAR) 5 MG tablet; 1 daily for 5 days then 1 bid, Disp-90 tablet, R-1Normal    3.  Need for influenza vaccination  -     INFLUENZA, QUADV, ADJUVANTED, 65 YRS =, IM, PF, PREFILL SYR, 0.5ML (FLUAD)    4. COPD, severe ([facility]  Managed by Dr. Erlinda Henry. Last OV with him was yesterday. Return in about 6 months (around 4/27/2022) for Hypertension. An electronic signature was used to authenticate this note.     --Dixie Nageotte, KINZA - CNP on 10/27/2021 at 12:07 PM

## 2021-10-27 NOTE — PROGRESS NOTES
Vaccine Information Sheet, \"Influenza - Inactivated\"  given to Guicho Smith, or parent/legal guardian of  Guicho Smith and verbalized understanding. Patient responses:    Have you ever had a reaction to a flu vaccine? No  Do you have any current illness? No  Have you ever had [place] Syndrome? No  Do you have a serious allergy to any of the follow: Neomycin, Polymyxin, Thimerosal, eggs or egg products? No    Flu vaccine given per order. Please see immunization tab. Risks and benefits explained. Current VIS given.

## 2021-11-03 ENCOUNTER — HOSPITAL ENCOUNTER (OUTPATIENT)
Dept: PULMONOLOGY | Age: 71
Discharge: HOME | End: 2021-11-03
Payer: MEDICARE

## 2021-11-03 ENCOUNTER — HOSPITAL ENCOUNTER (OUTPATIENT)
Dept: CT IMAGING | Age: 71
Discharge: HOME | End: 2021-11-03
Payer: MEDICARE

## 2021-11-03 DIAGNOSIS — J44.9 COPD, SEVERE (HCC): ICD-10-CM

## 2021-11-03 DIAGNOSIS — Z87.891 PERSONAL HISTORY OF TOBACCO USE: ICD-10-CM

## 2021-11-03 PROCEDURE — 94618 PULMONARY STRESS TESTING: CPT

## 2021-11-03 PROCEDURE — 71271 CT THORAX LUNG CANCER SCR C-: CPT

## 2021-11-04 NOTE — PROCEDURES
[address]                 [address] [address]                               PULMONARY FUNCTION    PATIENT NAME: Lg Hunt                     :        1950  MED REC NO:   [id number]                          ROOM:  ACCOUNT NO:   [de-identified]                           ADMIT DATE: 2021  PROVIDER:     Madai Palacios MD    DATE OF PROCEDURE:  2021    SIX-MINUTE WALK TEST    INDICATION:  COPD. Six-minute walk test per [facility] protocol. Baseline oxygen saturation  was 95%. Lowest oxygen saturation was 91% on room air. The patient  ambulated 980 feet. IMPRESSION:  Successful completion of a six-minute walk test  demonstrating mild oxyhemoglobin desaturation without the need for  supplemental oxygen.         Mitchell Cruz MD    D: 2021 13:21:38       T: 2021 15:56:04     [id number]  Job#: [id number]     Doc#: [id number]    CC:

## 2021-11-16 ENCOUNTER — TELEPHONE (OUTPATIENT)
Dept: PULMONOLOGY | Age: 71
End: 2021-11-16

## 2021-11-16 DIAGNOSIS — J43.9 PULMONARY EMPHYSEMA, UNSPECIFIED EMPHYSEMA TYPE (HCC): ICD-10-CM

## 2021-11-16 DIAGNOSIS — J44.9 COPD, SEVERE (HCC): Primary | ICD-10-CM

## 2021-11-22 RX ORDER — CITALOPRAM 20 MG/1
TABLET ORAL
Qty: 90 TABLET | Refills: 1 | Status: SHIPPED | OUTPATIENT
Start: 2021-11-22 | End: 2022-05-20

## 2021-11-22 NOTE — TELEPHONE ENCOUNTER
Refill Request     Last Seen: Last Seen Department: 10/27/2021  Last Seen by PCP: 4/21/2021    Last Written: 8/23/21    Next Appointment:   Future Appointments   Date Time Provider Jarred Villagomez   4/27/2022  1:30 PM DO NEO Rock  [place] [facility]   5/18/2022 10:40 AM [facility]       Future appointment scheduled      Requested Prescriptions     Pending Prescriptions Disp Refills    citalopram (CELEXA) 20 MG tablet [Pharmacy Med Name: CITALOPRAM HYDROBROMIDE TABS 20MG] 90 tablet 1     Sig: TAKE 1 TABLET DAILY
WDL

## 2021-11-28 NOTE — TELEPHONE ENCOUNTER
.  Refill Request     Last Seen: Last Seen Department: 10/27/2021  Last Seen by PCP: 4/21/2021    Last Written: 12/22/22 180 with 3     Next Appointment:   Future Appointments   Date Time Provider Jarred Villagomez   4/27/2022  1:30 PM DO NEO Cheema  [place] [facility]   5/18/2022 10:40 AM Clara Moreno MD [facility]         Requested Prescriptions     Pending Prescriptions Disp Refills    verapamil (CALAN SR) 120 MG extended release tablet [Pharmacy Med Name: VERAPAMIL HCL ER TABS 120MG] 180 tablet 3     Sig: TAKE 2 TABLETS AT BEDTIME

## 2021-12-07 RX ORDER — LISINOPRIL AND HYDROCHLOROTHIAZIDE 12.5; 1 MG/1; MG/1
TABLET ORAL
Qty: 90 TABLET | Refills: 1 | OUTPATIENT
Start: 2021-12-07

## 2021-12-07 RX ORDER — LISINOPRIL AND HYDROCHLOROTHIAZIDE 12.5; 1 MG/1; MG/1
TABLET ORAL
Qty: 90 TABLET | Refills: 1 | Status: SHIPPED | OUTPATIENT
Start: 2021-12-07 | End: 2022-06-09 | Stop reason: SDUPTHER

## 2021-12-07 NOTE — TELEPHONE ENCOUNTER
.  Refill Request     Last Seen: Last Seen Department: 10/27/2021  Last Seen by PCP: 4/21/2021    Last Written: 6-4-21 90 with 1     Next Appointment:   Future Appointments   Date Time Provider Jarred Villagomez   4/27/2022  1:30 PM DO NEO Dixon  [place] [facility]   [facility]18[facility]2022 10[facility]       Future appointment scheduled      Requested Prescriptions     Pending Prescriptions Disp Refills    lisinopril-hydroCHLOROthiazide (PRINZIDE;ZESTORETIC) 10-12.5 MG per tablet [Pharmacy Med Name: LISINOPRIL-HCTZ 10-12.5 MG TAB] 90 tablet 1     Sig: TAKE ONE TABLET BY MOUTH DAILY

## 2021-12-07 NOTE — TELEPHONE ENCOUNTER
Refill Request     Last Seen: Last Seen Department: 10/27/2021  Last Seen by PCP: 4/21/2021    Last Written: 6/4/2021 #90 X 1    Next Appointment:   Future Appointments   Date Time Provider Jarred Dahlia   4/27/2022  1:30 PM DO NEO Cheema  [place] [facility]   5/18/2022 10:40 AM MD CONCEPCIÓN Perry [facility]       Future appointment scheduled   PATIENT THOUGHT PHARMACY SENT OVER REFILL REQUEST A COUPLE OF DAYS AGO, NO REQUEST WAS FOUND.  PATIENT IS OUT   Requested Prescriptions     Pending Prescriptions Disp Refills    lisinopril-hydroCHLOROthiazide (PRINZIDE;ZESTORETIC) 10-12.5 MG per tablet 90 tablet 1     Sig: TAKE ONE TABLET BY MOUTH DAILY

## 2021-12-13 RX ORDER — CYCLOBENZAPRINE HCL 10 MG
TABLET ORAL
Qty: 90 TABLET | Refills: 0 | Status: SHIPPED | OUTPATIENT
Start: 2021-12-13 | End: 2022-03-09

## 2021-12-13 NOTE — TELEPHONE ENCOUNTER
Refill Request     Last Seen: Last Seen Department: 10/27/2021  Last Seen by PCP: Visit date not found    Last Written: 10/23/2020    Next Appointment:   Future Appointments   Date Time Provider Jarred Villagomez   4/27/2022  1:30 PM DO NEO Dixon [place] [facility]   [facility]18[facility]2022 10:40 [facility]       Future appointment scheduled      Requested Prescriptions     Pending Prescriptions Disp Refills    cyclobenzaprine (FLEXERIL) 10 MG tablet [Pharmacy Med Name: CYCLOBENZAPRINE 10 MG TABLET] 90 tablet 0     Sig: TAKE ONE TABLET BY MOUTH THREE TIMES A DAY AS NEEDED

## 2021-12-27 DIAGNOSIS — J43.9 PULMONARY EMPHYSEMA, UNSPECIFIED EMPHYSEMA TYPE (HCC): ICD-10-CM

## 2021-12-27 RX ORDER — TIOTROPIUM BROMIDE 18 UG/1
CAPSULE ORAL; RESPIRATORY (INHALATION)
Qty: 90 CAPSULE | Refills: 1 | Status: SHIPPED | OUTPATIENT
Start: 2021-12-27 | End: 2022-05-18 | Stop reason: SDUPTHER

## 2021-12-27 RX ORDER — ALBUTEROL SULFATE 90 UG/1
2 AEROSOL, METERED RESPIRATORY (INHALATION) EVERY 6 HOURS PRN
Qty: 54 G | Refills: 1 | Status: SHIPPED | OUTPATIENT
Start: 2021-12-27 | End: 2022-07-22 | Stop reason: SDUPTHER

## 2021-12-27 RX ORDER — BUDESONIDE AND FORMOTEROL FUMARATE DIHYDRATE 80; 4.5 UG/1; UG/1
2 AEROSOL RESPIRATORY (INHALATION) 2 TIMES DAILY
Qty: 30.6 G | Refills: 1 | Status: SHIPPED | OUTPATIENT
Start: 2021-12-27 | End: 2022-07-22 | Stop reason: SDUPTHER

## 2022-01-03 RX ORDER — OXYBUTYNIN CHLORIDE 5 MG/1
TABLET ORAL
Qty: 180 TABLET | Refills: 3 | Status: SHIPPED | OUTPATIENT
Start: 2022-01-03

## 2022-01-03 NOTE — TELEPHONE ENCOUNTER
Refill Request     Last Seen: Last Seen Department: 10/27/2021  Last Seen by PCP: 4/21/2021    Last Written: 7/7/21    Next Appointment:   Future Appointments   Date Time Provider Jarred Villagomez   4/27/2022  1:30 PM DO NEO Chamorro  [place] [facility]   5/18/2022 10:40 AM MD Sherman Mccarthy [facility]       Future appointment scheduled      Requested Prescriptions     Pending Prescriptions Disp Refills    oxybutynin (DITROPAN) 5 MG tablet [Pharmacy Med Name: OXYBUTYNIN CHLORIDE TABS 5MG] 180 tablet 3     Sig: TAKE 1 TABLET TWICE A DAY

## 2022-03-09 RX ORDER — CYCLOBENZAPRINE HCL 10 MG
TABLET ORAL
Qty: 90 TABLET | Refills: 1 | Status: SHIPPED | OUTPATIENT
Start: 2022-03-09

## 2022-03-09 NOTE — TELEPHONE ENCOUNTER
Refill Request     Last Seen: Last Seen Department: 10/27/2021  Last Seen by PCP: Visit date not found    Last Written: 12/13/2021    Next Appointment:   Future Appointments   Date Time Provider Jarred Villagomez   4/27/2022  1:30 PM DO NEO Leal  [place] [facility]   5/18/2022 10:40 AM MD Annette Vega [facility]       Future appointment scheduled      Requested Prescriptions     Pending Prescriptions Disp Refills    cyclobenzaprine (FLEXERIL) 10 MG tablet [Pharmacy Med Name: CYCLOBENZAPRINE 10 MG TABLET] 90 tablet 1     Sig: TAKE ONE TABLET BY MOUTH THREE TIMES A DAY AS NEEDED

## 2022-03-28 RX ORDER — NAPROXEN 500 MG/1
TABLET ORAL
Qty: 180 TABLET | Refills: 0 | Status: SHIPPED | OUTPATIENT
Start: 2022-03-28 | End: 2022-09-04

## 2022-03-28 NOTE — TELEPHONE ENCOUNTER
Refill Request     Last Seen: Last Seen Department: 10/27/2021  Last Seen by PCP: 4/21/2021    Last Written: 9/10/2021    Next Appointment:   Future Appointments   Date Time Provider Jarred Villagomez   4/27/2022  1:30 PM Criss Lombard Heindl, DO [place]  [place] [facility]   5/18/2022 10:40 AM MD Merry Sneed [facility]       Future appointment scheduled      Requested Prescriptions     Pending Prescriptions Disp Refills    naproxen (NAPROSYN) 500 MG tablet 180 tablet 0     Sig: TAKE ONE TABLET BY MOUTH TWO TIMES A DAY AS NEEDED FOR PAIN

## 2022-03-28 NOTE — TELEPHONE ENCOUNTER
----- Message from Sergei Pleitez sent at 3/28/2022 11:21 AM EDT -----  Subject: Refill Request    QUESTIONS  Name of Medication? naproxen (NAPROSYN) 500 MG tablet  Patient-reported dosage and instructions? 500 mg, once daily  How many days do you have left? 0  Preferred Pharmacy? [address]  phone number (if available)? [phone]  ---------------------------------------------------------------------------  --------------  Michael FRAUSTO  What is the best way for the office to contact you? OK to leave message on   voicemail  Preferred Call Back Phone Number?  [phone]

## 2022-03-30 ENCOUNTER — TELEPHONE (OUTPATIENT)
Dept: FAMILY MEDICINE CLINIC | Age: 72
End: 2022-03-30

## 2022-04-22 ENCOUNTER — TELEPHONE (OUTPATIENT)
Dept: FAMILY MEDICINE CLINIC | Age: 72
End: 2022-04-22

## 2022-04-27 ENCOUNTER — OFFICE VISIT (OUTPATIENT)
Dept: FAMILY MEDICINE CLINIC | Age: 72
End: 2022-04-27
Payer: MEDICARE

## 2022-04-27 VITALS
HEIGHT: 65 IN | HEART RATE: 87 BPM | BODY MASS INDEX: 18.09 KG/M2 | SYSTOLIC BLOOD PRESSURE: 130 MMHG | DIASTOLIC BLOOD PRESSURE: 60 MMHG | WEIGHT: 108.6 LBS | OXYGEN SATURATION: 94 %

## 2022-04-27 DIAGNOSIS — M25.512 CHRONIC LEFT SHOULDER PAIN: ICD-10-CM

## 2022-04-27 DIAGNOSIS — G44.229 CHRONIC TENSION-TYPE HEADACHE, NOT INTRACTABLE: ICD-10-CM

## 2022-04-27 DIAGNOSIS — F41.9 ANXIETY: ICD-10-CM

## 2022-04-27 DIAGNOSIS — I10 ESSENTIAL HYPERTENSION: Primary | ICD-10-CM

## 2022-04-27 DIAGNOSIS — G89.29 CHRONIC LEFT SHOULDER PAIN: ICD-10-CM

## 2022-04-27 DIAGNOSIS — J44.9 COPD, SEVERE (HCC): ICD-10-CM

## 2022-04-27 DIAGNOSIS — N39.46 MIXED STRESS AND URGE URINARY INCONTINENCE: ICD-10-CM

## 2022-04-27 PROCEDURE — 99213 OFFICE O/P EST LOW 20 MIN: CPT | Performed by: FAMILY MEDICINE

## 2022-04-27 ASSESSMENT — ENCOUNTER SYMPTOMS
ABDOMINAL PAIN: 0
VOICE CHANGE: 0
COUGH: 1
NAUSEA: 0
ABDOMINAL DISTENTION: 0
EYE DISCHARGE: 0
CHEST TIGHTNESS: 0
BACK PAIN: 0
ANAL BLEEDING: 0
RHINORRHEA: 0
SORE THROAT: 0
DIARRHEA: 0
VOMITING: 0
EYE ITCHING: 0
SHORTNESS OF BREATH: 1
EYE REDNESS: 0
SINUS PRESSURE: 0
EYE PAIN: 0
TROUBLE SWALLOWING: 0
BLOOD IN STOOL: 0
WHEEZING: 0
CONSTIPATION: 0

## 2022-04-27 NOTE — PROGRESS NOTES
Subjective:      Patient ID: Burak Christopher is a 71 y.o. female. HPI  Patient in for 6-month checkup on several medical issues. Hypertension blood pressure typically 140/80 or below when she checks at home or elsewhere. COPDon medication and sees pulmonologist and breathing is definitely not improving. Stress and urge incontinenceshe has Detrol 5 mg at home and the bottle says twice a day and she is only taken 1 which helps some. Anxiety not well at all she lives by herself has to do everything around the house inside and out and she has a daughter who is very ill. She has chronic bitemporal headaches daily and really does not take much for it but aspirin helps some. She has chronic pain in her left shoulder which originates 2014 after a fallshe was seen in the ER and x-ray showed degenerative disease in the shoulder joint and the [facility] joint. She denies any other issues to discuss. She does have Cologuard box at home but has not done the test yet. Review of Systems    Review of Systems   Constitutional: Positive for fatigue. Negative for unexpected weight change. HENT: Negative for congestion, ear pain, hearing loss, nosebleeds, postnasal drip, rhinorrhea, sinus pressure, sneezing, sore throat, tinnitus, trouble swallowing and voice change. Eyes: Negative for pain, discharge, redness, itching and visual disturbance. Respiratory: Positive for cough and shortness of breath. Negative for chest tightness and wheezing. Cardiovascular: Negative for chest pain, palpitations and leg swelling. Gastrointestinal: Negative for abdominal distention, abdominal pain, anal bleeding, blood in stool, constipation, diarrhea, nausea and vomiting. Genitourinary: Negative for dysuria, flank pain, frequency, hematuria, menstrual problem, pelvic pain, urgency and vaginal discharge. Stress and urge incontinenceshe see HPI. Musculoskeletal: Positive for arthralgias and neck pain.  Negative for back pain, gait problem, joint swelling and myalgias. See HPI. Skin: Negative for pallor and rash. Neurological: Negative for dizziness, tremors, seizures, weakness, light-headedness, numbness and headaches. Hematological: Negative for adenopathy. Does not bruise/bleed easily. Psychiatric/Behavioral: Positive for dysphoric mood and sleep disturbance. Negative for agitation, confusion and decreased concentration. The patient is nervous/anxious. The patient is not hyperactive. Objective:   Physical Exam      Physical Exam  Constitutional:       General: She is not in acute distress. Appearance: Normal appearance. She is well-developed. She is not ill-appearing. Comments: Somewhat underweight. HENT:      Head: Normocephalic. Mouth/Throat:      Mouth: Mucous membranes are moist.      Pharynx: Oropharynx is clear. Eyes:      General: No scleral icterus. Conjunctiva/sclera: Conjunctivae normal.   Neck:      Thyroid: No thyromegaly. Vascular: No carotid bruit. Cardiovascular:      Rate and Rhythm: Normal rate and regular rhythm. Heart sounds: Normal heart sounds. Pulmonary:      Effort: Pulmonary effort is normal.      Comments: Breath sounds moderate to severely depressed but clear. Abdominal:      General: Bowel sounds are normal. There is no distension. Palpations: Abdomen is soft. There is no mass. Tenderness: There is no abdominal tenderness. Musculoskeletal:      Cervical back: Neck supple. Right lower leg: No edema. Left lower leg: No edema. Comments: Left shoulder abductionpain at 50 degrees. Crepitation present. Lymphadenopathy:      Cervical: No cervical adenopathy. Skin:     General: Skin is warm and dry. Coloration: Skin is not pale. Neurological:      Mental Status: She is alert and oriented to person, place, and time. Motor: No abnormal muscle tone.       Gait: Gait normal.   Psychiatric:         Behavior: Behavior normal.         Thought Content: Thought content normal.         Judgment: Judgment normal.      Comments: Seems somewhat anxious and stressed out due to her problems mentioned in HPI. Assessment:       Diagnosis Orders   1. Essential hypertension     2. COPD, severe ([address]     3. Mixed stress and urge urinary incontinence     4. Anxiety     5. Chronic tension-type headache, not intractable     6. Chronic left shoulder pain  Ann Yanez MD, Orthopedic Surgery (General), [facility]         Plan:      Bassam Urbina was seen today for 6 month follow-up. Diagnoses and all orders for this visit:    Essential hypertension  Continue medications and no added salt diet limit caffeine and preferably no alcohol. Notify me if any persistent increase in blood pressure. COPD, severe ([address]  Continue medications and visits to the pulmonologist when scheduled  Mixed stress and urge urinary incontinence  Start taking Ditropan 5 mg twice a day call me in 2 weeks with an update. Anxiety  Continue medications. Call me of any worsening anxiety symptoms. Chronic tension-type headache, not intractable  Try to aspirin only once a day as needed for headaches and call me with an update in 2 weeks. Chronic left shoulder pain  -     Ann Yanez MD, Orthopedic Surgery (General)[facility]  Refer to orthopedics. This is been a 25-minute visit with the patient.     See me 2 months          Chico Siegel,

## 2022-04-28 NOTE — PROGRESS NOTES
arthralgias. Neurological: Negative for tremors and headaches. Psychiatric/Behavioral: The patient is not nervous/anxious. Objective:   Physical Exam      Physical Exam  Constitutional:       Appearance: She is normal weight. HENT:      Mouth/Throat:      Mouth: Mucous membranes are moist.      Pharynx: Oropharynx is clear. Comments: Healing herpes simplex upper left lip  Eyes:      Conjunctiva/sclera: Conjunctivae normal.   Neck:      Vascular: No carotid bruit. Cardiovascular:      Rate and Rhythm: Tachycardia present. Heart sounds: Normal heart sounds. Comments: occas extras  Pulmonary:      Comments: Mild decrease BS's-essent clear  Abdominal:      Palpations: Abdomen is soft. Tenderness: There is no tenderness. Musculoskeletal:      Right lower leg: No edema. Left lower leg: No edema. Lymphadenopathy:      Cervical: No cervical adenopathy. Skin:     General: Skin is warm and dry. Neurological:      Mental Status: She is alert and oriented to person, place, and time. Psychiatric:         Mood and Affect: Mood normal.         Behavior: Behavior normal.         Thought Content: Thought content normal.         Judgment: Judgment normal.         Assessment:      1. COPD, severe ([address]    2. Essential hypertension    3. Anxiety    4. Herpes simplex virus infection            Plan:      Jesus Manuel Zuleta was seen today for cough, other and shortness of breath. Diagnoses and all orders for this visit:    COPD, severe ([address]  Continue medications and seeing pulmonologist when scheduled. Start using the nebulizer every morning and later in the day if you feel like you need it. Essential hypertension  Continue medications and no added salt diet. Keep weight down and be as active as possible. Anxiety  Continue medications and be as active as possible.   Herpes simplex virus infection  Comments:  lip  Prescription sent for acyclovir for 5 days  Breast cancer screening  Refer for Admission Reconciliation is Completed  Discharge Reconciliation is Not Complete Admission Reconciliation is Completed  Discharge Reconciliation is Completed

## 2022-05-10 RX ORDER — MONTELUKAST SODIUM 10 MG/1
TABLET ORAL
Qty: 90 TABLET | Refills: 3 | Status: SHIPPED | OUTPATIENT
Start: 2022-05-10

## 2022-05-10 NOTE — TELEPHONE ENCOUNTER
Refill Request     CONFIRM preferrred pharmacy with the patient. If Mail Order Rx - Pend for 90 day refill.       Last Seen: Last Seen Department: 4/27/2022  Last Seen by PCP: 4/27/2022    Last Written: 05/17/2021 90 Tablet 3 refills    Next Appointment:   Future Appointments   Date Time Provider Jarred Villagomez   5/17/2022  1:00 PM Ralph Cazares MD [facility]   5/18/2022 10:40 AM Kourtney Bhardwaj MD [facility]   9/14/2022  2:30 PM Chico Siegel DO [facility] [place] [facility]       Future appointment scheduled      Requested Prescriptions     Pending Prescriptions Disp Refills    montelukast (SINGULAIR) 10 MG tablet [Pharmacy Med Name: MONTELUKAST SODIUM TABS 10MG] 90 tablet 3     Sig: TAKE 1 TABLET NIGHTLY greater than 4 hours after birth

## 2022-05-17 ENCOUNTER — OFFICE VISIT (OUTPATIENT)
Dept: ORTHOPEDIC SURGERY | Age: 72
End: 2022-05-17

## 2022-05-17 VITALS — BODY MASS INDEX: 17.99 KG/M2 | WEIGHT: 108 LBS | HEIGHT: 65 IN

## 2022-05-17 DIAGNOSIS — M25.512 LEFT SHOULDER PAIN, UNSPECIFIED CHRONICITY: ICD-10-CM

## 2022-05-17 DIAGNOSIS — M19.012 PRIMARY OSTEOARTHRITIS OF LEFT SHOULDER: Primary | ICD-10-CM

## 2022-05-17 PROCEDURE — 99213 OFFICE O/P EST LOW 20 MIN: CPT | Performed by: ORTHOPAEDIC SURGERY

## 2022-05-17 RX ORDER — MELOXICAM 7.5 MG/1
7.5 TABLET ORAL DAILY
Qty: 30 TABLET | Refills: 3 | Status: SHIPPED | OUTPATIENT
Start: 2022-05-17

## 2022-05-17 RX ORDER — METHYLPREDNISOLONE 4 MG/1
TABLET ORAL
Qty: 1 KIT | Refills: 0 | Status: SHIPPED | OUTPATIENT
Start: 2022-05-17 | End: 2022-05-23

## 2022-05-17 NOTE — PROGRESS NOTES
SHOULDER VISIT      HISTORY OF PRESENT ILLNESS    Marylin Carrel is a 71 y.o. female who presents for consultation at request of Dr. Estella Crandall for left shoulder pain. Over the last year or so she had increasingly severe problems with pain and weakness in her left shoulder during certain motions. She grades her pain 8/10 at worst and an ache is there pretty much at all times. She cannot recall any history of injury. She does feel cracking and grinding when she moves her shoulder in certain positions. ROS    Well-documented patient history form dated 5/17/2022  All other ROS negative except for above. Past Surgical history    Past Surgical History:   Procedure Laterality Date    CATARACT REMOVAL WITH IMPLANT Bilateral 09/19/2019    CYSTOSCOPY  9/13    neg-done for incont    HYSTERECTOMY      TONSILLECTOMY         PAST MEDICAL    Past Medical History:   Diagnosis Date    Allergic rhinitis     Anxiety 3/12/2018    Asthma     [facility]     COPD (chronic obstructive pulmonary disease) ([facility])     Headache(784.0)     Hypertension     Pneumonia     Primary osteoarthritis involving multiple joints 3/12/2018    Raynaud disease 4/16/2013    Tobacco abuse        Allergies    Allergies   Allergen Reactions    Acetaminophen     Codeine Other (See Comments)     Pt says she can't function on codeine    Morphine     Sulfa Antibiotics Hives       Meds    Current Outpatient Medications   Medication Sig Dispense Refill    meloxicam (MOBIC) 7.5 MG tablet Take 1 tablet by mouth daily 30 tablet 3    methylPREDNISolone (MEDROL, ROSALBA,) 4 MG tablet Take by mouth.  1 kit 0    montelukast (SINGULAIR) 10 MG tablet TAKE 1 TABLET NIGHTLY 90 tablet 3    naproxen (NAPROSYN) 500 MG tablet TAKE ONE TABLET BY MOUTH TWO TIMES A DAY AS NEEDED FOR PAIN 180 tablet 0    cyclobenzaprine (FLEXERIL) 10 MG tablet TAKE ONE TABLET BY MOUTH THREE TIMES A DAY AS NEEDED 90 tablet 1    oxybutynin (DITROPAN) 5 MG tablet TAKE 1 TABLET TWICE A  tablet 3    albuterol sulfate HFA (VENTOLIN HFA) 108 (90 Base) MCG/ACT inhaler Inhale 2 puffs into the lungs every 6 hours as needed for Wheezing or Shortness of Breath 54 g 1    tiotropium (SPIRIVA HANDIHALER) 18 MCG inhalation capsule INHALE THE CONTENTS OF 1 CAPSULE DAILY 90 capsule 1    budesonide-formoterol (SYMBICORT) 80-4.5 MCG/ACT AERO Inhale 2 puffs into the lungs 2 times daily 30.6 g 1    lisinopril-hydroCHLOROthiazide (PRINZIDE;ZESTORETIC) 10-12.5 MG per tablet TAKE ONE TABLET BY MOUTH DAILY 90 tablet 1    verapamil (CALAN SR) 120 MG extended release tablet TAKE 2 TABLETS AT BEDTIME 180 tablet 3    citalopram (CELEXA) 20 MG tablet TAKE 1 TABLET DAILY 90 tablet 1    busPIRone (BUSPAR) 5 MG tablet 1 daily for 5 days then 1 bid 90 tablet 1    albuterol sulfate  (90 Base) MCG/ACT inhaler Inhale 2 puffs into the lungs every 6 hours as needed for Wheezing 3 Inhaler 1    ipratropium-albuterol (DUONEB) 0.5-2.5 (3) MG/3ML SOLN nebulizer solution Inhale 3 mLs into the lungs every 4 hours as needed for Shortness of Breath 360 mL 2    fluticasone (FLONASE) 50 MCG/ACT nasal spray PLACE TWO SPRAYS IN EACH NOSTRIL ONCE DAILY 1 Bottle 5    Biotin w/ Vitamins C & E (HAIR/SKIN/NAILS PO) Take by mouth daily       DiphenhydrAMINE HCl (BENADRYL ALLERGY PO) Take by mouth      guaiFENesin (MUCINEX) 600 MG extended release tablet Take 1 tablet by mouth 2 times daily 60 tablet 3    lidocaine (LIDODERM) 5 % Place 1 patch onto the skin daily. 12 hours on, 12 hours off. 90 patch 0    Multiple Vitamins-Minerals (MULTIVITAMIN PO) Take 1 tablet by mouth daily.  Potassium Gluconate 550 MG TABS Take 1 tablet by mouth daily. No current facility-administered medications for this visit. Social    Social History     Socioeconomic History    Marital status:       Spouse name: Not on file    Number of children: Not on file    Years of education: Not on file    Highest education level: Not on file   Occupational History    Not on file   Tobacco Use    Smoking status: Former Smoker     Packs/day: 1.00     Years: 30.00     Pack years: 30.00     Types: Cigarettes     Quit date: 2017     Years since quittin.3    Smokeless tobacco: Never Used   Vaping Use    Vaping Use: Never used   Substance and Sexual Activity    Alcohol use: No     Alcohol/week: 0.0 standard drinks    Drug use: No    Sexual activity: Never   Other Topics Concern    Not on file   Social History Narrative    Not on file     Social Determinants of Health     Financial Resource Strain: Low Risk     Difficulty of Paying Living Expenses: Not hard at all   Food Insecurity: No Food Insecurity    Worried About Running Out of Food in the Last Year: Never true    [place] of Food in the Last Year: Never true   Transportation Needs: No Transportation Needs    Lack of Transportation (Medical): No    Lack of Transportation (Non-Medical):  No   Physical Activity:     Days of Exercise per Week: Not on file    Minutes of Exercise per Session: Not on file   Stress:     Feeling of Stress : Not on file   Social Connections:     Frequency of Communication with Friends and Family: Not on file    Frequency of Social Gatherings with Friends and Family: Not on file    Attends [employer] Services: Not on file    Active Member of Clubs or Organizations: Not on file    Attends Club or Organization Meetings: Not on file    Marital Status: Not on file   Intimate Partner Violence:     Fear of Current or Ex-Partner: Not on file    Emotionally Abused: Not on file    Physically Abused: Not on file    Sexually Abused: Not on file   Housing Stability: Unknown    Unable to Pay for Housing in the Last Year: No    Number of [place] in the Last Year: Not on file    Unstable Housing in the Last Year: No       Family HISTORY    Family History   Problem Relation Age of Onset    High Blood Pressure Mother    Keely Moon COPD Mother     Heart Disease Father        PHYSICAL EXAM    Vital Signs:  Ht 5' 5\" (1.651 m)   Wt 108 lb (49 kg)   BMI 17.97 kg/m²   General Appearance:  Normal body habitus. Alert and oriented to person, place, and time. Affect:  Normal.   Skin:  Intact. Sensation:  Intact. Strength:  Intact. Reflexes:  Intact. Pulses:  Intact. Shoulder Exam  She has a full range of motion of the neck. Examination of the shoulder reveals: Very limited range of motion having about 45 degrees of flexion all directions and including internal and external rotation. She has crepitus and grinding consistent with glenohumeral osteoarthritis. She appears to have integrity of the rotator cuff but because of the limited motion and adequate exam cannot be determined. Her neurocirculatory lymphatic exam is otherwise normal symmetric to both upper extremities. She has no tenderness over the proximal biceps. She has a full active range of motion of the elbow, wrist and hand. Range of motion  (in degrees)   Right Left   ABDUCTION       EXT. ROTATION       INT. ROTATION       FORWARD FLEX    STRENGTH         Provocative Test Positive Negative Not indicated   Spurling Sign [] [x] []   Cross Arm Adduction Test [x] [] []   Apprehension Sign [] [] [x]   Neer Sign [] [] []   [place] Impingement Sign [x] [] []   Yergason test [] [] []   ODelfinos test [] [] []     Other Provocative tests:     IMAGING STUDIES    X-rays 2 views of her left shoulder taken today demonstrate severe end-stage osteoarthritis of the left shoulder with good alignment of the glenohumeral joint. IMPRESSION    Left shoulder glenohumeral osteoarthritis    PLAN    1. Conservative care options including medicines and therapy were discussed. 2.  The indications for therapeutic injections were discussed. 3.  The indications for additional imaging studies were discussed.    4.  After considering the various options discussed, the patient elected to pursue a course that includes a Medrol Dosepak followed by meloxicam.  I will also place her in an aggressive physician directed therapy program.  If her problem persists or does not improve substantially with this care, I would recommend that an MRI be obtained and she be referred to one of our shoulder replacement specialist.

## 2022-05-18 ENCOUNTER — OFFICE VISIT (OUTPATIENT)
Dept: PULMONOLOGY | Age: 72
End: 2022-05-18
Payer: MEDICARE

## 2022-05-18 VITALS
DIASTOLIC BLOOD PRESSURE: 60 MMHG | HEIGHT: 65 IN | SYSTOLIC BLOOD PRESSURE: 135 MMHG | BODY MASS INDEX: 17.49 KG/M2 | HEART RATE: 64 BPM | WEIGHT: 105 LBS | OXYGEN SATURATION: 94 %

## 2022-05-18 DIAGNOSIS — J44.9 COPD, SEVERE (HCC): ICD-10-CM

## 2022-05-18 DIAGNOSIS — Z87.891 PERSONAL HISTORY OF TOBACCO USE: Primary | ICD-10-CM

## 2022-05-18 DIAGNOSIS — J43.9 PULMONARY EMPHYSEMA, UNSPECIFIED EMPHYSEMA TYPE (HCC): ICD-10-CM

## 2022-05-18 PROCEDURE — G0296 VISIT TO DETERM LDCT ELIG: HCPCS | Performed by: INTERNAL MEDICINE

## 2022-05-18 PROCEDURE — 99214 OFFICE O/P EST MOD 30 MIN: CPT | Performed by: INTERNAL MEDICINE

## 2022-05-18 RX ORDER — TIOTROPIUM BROMIDE 18 UG/1
CAPSULE ORAL; RESPIRATORY (INHALATION)
Qty: 90 CAPSULE | Refills: 1 | Status: SHIPPED | OUTPATIENT
Start: 2022-05-18 | End: 2022-09-02 | Stop reason: SDUPTHER

## 2022-05-18 NOTE — PROGRESS NOTES
[facility] Pulmonary, Critical Care, and Sleep    Outpatient Follow Up Note    CC: COPD  Consulting provider: Dr. Morel Check    Interval History: 71 y.o. female  Dyspnea on exertion is slowly progressing. Out of Spiriva. She has not been wheezing. Compliant with inhalers. She is on a steroid for shoulder pain. Initial HPI: referred for COPD. Does not use inhaler daily. Does wheeze every day and has dry cough daily. Uses PRN inhaler less than once per week. Uses advair less than once per week. She waits until symptoms are flared and then stars using inhalers when she can hardly breath. She states she has some environmental/seasonal allergies that cause wheezing and itchy eyes.    Smoked since age 9 at 1 PPD  Current Medications:    Current Outpatient Medications:     methylPREDNISolone (MEDROL, ROSALBA,) 4 MG tablet, Take by mouth., Disp: 1 kit, Rfl: 0    montelukast (SINGULAIR) 10 MG tablet, TAKE 1 TABLET NIGHTLY, Disp: 90 tablet, Rfl: 3    naproxen (NAPROSYN) 500 MG tablet, TAKE ONE TABLET BY MOUTH TWO TIMES A DAY AS NEEDED FOR PAIN, Disp: 180 tablet, Rfl: 0    cyclobenzaprine (FLEXERIL) 10 MG tablet, TAKE ONE TABLET BY MOUTH THREE TIMES A DAY AS NEEDED, Disp: 90 tablet, Rfl: 1    oxybutynin (DITROPAN) 5 MG tablet, TAKE 1 TABLET TWICE A DAY, Disp: 180 tablet, Rfl: 3    albuterol sulfate HFA (VENTOLIN HFA) 108 (90 Base) MCG/ACT inhaler, Inhale 2 puffs into the lungs every 6 hours as needed for Wheezing or Shortness of Breath, Disp: 54 g, Rfl: 1    tiotropium (SPIRIVA HANDIHALER) 18 MCG inhalation capsule, INHALE THE CONTENTS OF 1 CAPSULE DAILY, Disp: 90 capsule, Rfl: 1    budesonide-formoterol (SYMBICORT) 80-4.5 MCG/ACT AERO, Inhale 2 puffs into the lungs 2 times daily, Disp: 30.6 g, Rfl: 1    lisinopril-hydroCHLOROthiazide (PRINZIDE;ZESTORETIC) 10-12.5 MG per tablet, TAKE ONE TABLET BY MOUTH DAILY, Disp: 90 tablet, Rfl: 1    verapamil (CALAN SR) 120 MG extended release tablet, TAKE 2 TABLETS AT BEDTIME, Disp: 180 tablet, Rfl: 3    citalopram (CELEXA) 20 MG tablet, TAKE 1 TABLET DAILY, Disp: 90 tablet, Rfl: 1    busPIRone (BUSPAR) 5 MG tablet, 1 daily for 5 days then 1 bid, Disp: 90 tablet, Rfl: 1    albuterol sulfate  (90 Base) MCG/ACT inhaler, Inhale 2 puffs into the lungs every 6 hours as needed for Wheezing, Disp: 3 Inhaler, Rfl: 1    ipratropium-albuterol (DUONEB) 0.5-2.5 (3) MG/3ML SOLN nebulizer solution, Inhale 3 mLs into the lungs every 4 hours as needed for Shortness of Breath, Disp: 360 mL, Rfl: 2    Biotin w/ Vitamins C & E (HAIR/SKIN/NAILS PO), Take by mouth daily , Disp: , Rfl:     DiphenhydrAMINE HCl (BENADRYL ALLERGY PO), Take by mouth, Disp: , Rfl:     guaiFENesin (MUCINEX) 600 MG extended release tablet, Take 1 tablet by mouth 2 times daily, Disp: 60 tablet, Rfl: 3    Multiple Vitamins-Minerals (MULTIVITAMIN PO), Take 1 tablet by mouth daily. , Disp: , Rfl:     Potassium Gluconate 550 MG TABS, Take 1 tablet by mouth daily. , Disp: , Rfl:     meloxicam (MOBIC) 7.5 MG tablet, Take 1 tablet by mouth daily (Patient not taking: Reported on 5/18/2022), Disp: 30 tablet, Rfl: 3    fluticasone (FLONASE) 50 MCG/ACT nasal spray, PLACE TWO SPRAYS IN EACH NOSTRIL ONCE DAILY (Patient not taking: Reported on 5/18/2022), Disp: 1 Bottle, Rfl: 5    lidocaine (LIDODERM) 5 %, Place 1 patch onto the skin daily. 12 hours on, 12 hours off. (Patient not taking: Reported on 5/18/2022), Disp: 90 patch, Rfl: 0    Objective:   PHYSICAL EXAM:    /60 (Site: Left Upper Arm, Position: Sitting, Cuff Size: Medium Adult)   Pulse 64   Ht 5' 5\" (1.651 m)   Wt 105 lb (47.6 kg)   SpO2 94% Comment: RA  BMI 17.47 kg/m²   Constitutional: In no acute distress. Appears stated age. Well developed and nourished  Eyes: PERRL. No sclera icterus. No conjunctival injection. ENT: Oropharynx with mild erythema. Neck: Trachea midline. No thyroid tenderness. Lymph: No cervical LAD. No supraclavicular LAD.    Resp: No accessory muscle use. No crackles. No  wheezes. No rhonchi. CV: Regular rate. Regular rhythm. No murmur or rub. No lower extremity edema. Psych: Oriented x 3. Mood and affect normal. Intact judgment and insight. LABS:  Reviewed any pertinent new labs that are available. PFTs   8/16/14 FVC  (%) FEV1  1.11 L(42%) FEV1/FVC ratio  52  TLC  (85%)  RV  (122%)   DLCO (46%) Bronchodilator response:no  5/9/17 FVC  (77%) FEV1  1.33 L(51%) FEV1/FVC ratio  51  TLC  (92%)  RV  (128%)   DLCO (46%) Bronchodilator response:no    6MWT: 720ft    IMAGING:  I personally reviewed and interpreted the following today in the office:       ASSESSMENT:   Severe COPD   Seasonal/environmental allergies   Allergic Rhinitis   Tobacco abuse - quit 1/2017  Over 30 pack year smoking history  Anxiety and depression    PLAN:   Rx for Spiriva  Continue Symbicort  Continue PRN albuterol MDI/nebs  Continue Singulair  Claritin and Flonase  Pulm rehab declined  Return for COPD in 6 mo and LDCT   Screening CT scan was considered in a lung cancer screening counseling and shared decision making visit today that included the following elements:   Eligibility: Age:72. There are no signs or symptoms of lung cancer.   Tobacco History 55 pack-years, quit 5 years ago in 2017  Verbal counseling has been performed by me to include benefits and harms of screening, follow-up diagnostic testing, over-diagnosis, false positive rate, and total radiation exposure;   I have counseled on the importance of adherence to annual lung cancer LDCT screening, the impact of comorbidities and patient is willing to undergo diagnosis and treatment;   I have provided counseling on the importance of maintaining cigarette smoking abstinence if former smoker; or the importance of smoking cessation if current smoker and, if appropriate, furnishing of information about tobacco cessation intervention

## 2022-05-18 NOTE — PROGRESS NOTES
Low Dose CT (LDCT) Lung Screening criteria met:     Age 50-77(Medicare) or 50-80 ([facility])   Pack year smoking >20   Still smoking or less than 15 year since quit   No sign or symptoms of lung cancer   > 11 months since last LDCT     Risks and benefits of lung cancer screening with LDCT scans discussed:    Significance of positive screen - False-positive LDCT results often occur. 95% of all positive results do not lead to a diagnosis of cancer. Usually further imaging can resolve most false-positive results; however, some patients may require invasive procedures. Over diagnosis risk - 10% to 12% of screen-detected lung cancer cases are over diagnosed--that is, the cancer would not have been detected in the patient's lifetime without the screening. Need for follow up screens annually to continue lung cancer screening effectiveness     Risks associated with radiation from annual LDCT- Radiation exposure is about the same as for a mammogram, which is about 1/3 of the annual background radiation exposure from everyday life. Starting screening at age 54 is not likely to increase cancer risk from radiation exposure. Patients with comorbidities resulting in life expectancy of < 10 years, or that would preclude treatment of an abnormality identified on CT, should not be screened due to lack of benefit.     To obtain maximal benefit from this screening, smoking cessation and long-term abstinence from smoking is critical

## 2022-05-20 RX ORDER — CITALOPRAM 20 MG/1
TABLET ORAL
Qty: 90 TABLET | Refills: 3 | Status: SHIPPED | OUTPATIENT
Start: 2022-05-20

## 2022-05-20 NOTE — TELEPHONE ENCOUNTER
Refill Request     CONFIRM preferrred pharmacy with the patient. If Mail Order Rx - Pend for 90 day refill.       Last Seen: Last Seen Department: 4/27/2022  Last Seen by PCP: Visit date not found    Last Written: 11/22/2021    Next Appointment:   Future Appointments   Date Time Provider Jarred Villagomez   9/14/2022  2:30 PM DO NEO Cheema [facility] [place] - [facility]   11/11/2022  1:30 PM [facility] ED [facility] [place] Rad   11/15/2022  2:20 PM [facility]       Future appointment scheduled      Requested Prescriptions     Pending Prescriptions Disp Refills    citalopram (CELEXA) 20 MG tablet [Pharmacy Med Name: CITALOPRAM HYDROBROMIDE TABS 20MG] 90 tablet 1     Sig: TAKE 1 TABLET DAILY

## 2022-06-07 ENCOUNTER — TELEPHONE (OUTPATIENT)
Dept: ORTHOPEDIC SURGERY | Age: 72
End: 2022-06-07

## 2022-06-07 NOTE — TELEPHONE ENCOUNTER
R/C TO PATIENT. ADVISED TO STOP TAKING MELOXICAM AND TRY ALEVE OR ADVIL INSTEAD. PATIENT VERBALIZED UNDERSTANDING.

## 2022-06-07 NOTE — TELEPHONE ENCOUNTER
Patient called and said she finished her steroid and took a meloxicam and it made super dizzy and she had to lay down on the floor.  Would like a call back to see what she should do

## 2022-06-09 RX ORDER — LISINOPRIL AND HYDROCHLOROTHIAZIDE 12.5; 1 MG/1; MG/1
TABLET ORAL
Qty: 90 TABLET | Refills: 1 | Status: SHIPPED | OUTPATIENT
Start: 2022-06-09

## 2022-06-09 NOTE — TELEPHONE ENCOUNTER
Refill Request     CONFIRM preferrred pharmacy with the patient. If Mail Order Rx - Pend for 90 day refill.       Last Seen: Last Seen Department: 4/27/2022    Last Seen by PCP: 4/27/2022    Last Written: 12/7/21 90 tablet 1 refill    Next Appointment: 9/14/22  Future Appointments   Date Time Provider Jarred Villagomez   9/14/2022  2:30 PM DO NEO Cheema  [place]   11/11/2022  1:30 PM [facility] [place] [facility]   11/15/2022  2:20 PM MD CONCEPCIÓN Gutierres PULM [facility]       Future appointment scheduled      Requested Prescriptions     Pending Prescriptions Disp Refills    lisinopril-hydroCHLOROthiazide (PRINZIDE;ZESTORETIC) 10-12.5 MG per tablet 90 tablet 1

## 2022-07-19 ENCOUNTER — TELEPHONE (OUTPATIENT)
Dept: FAMILY MEDICINE CLINIC | Age: 72
End: 2022-07-19

## 2022-07-22 RX ORDER — BUDESONIDE AND FORMOTEROL FUMARATE DIHYDRATE 80; 4.5 UG/1; UG/1
2 AEROSOL RESPIRATORY (INHALATION) 2 TIMES DAILY
Qty: 30.6 G | Refills: 1 | Status: SHIPPED | OUTPATIENT
Start: 2022-07-22

## 2022-07-22 RX ORDER — ALBUTEROL SULFATE 90 UG/1
2 AEROSOL, METERED RESPIRATORY (INHALATION) EVERY 6 HOURS PRN
Qty: 54 G | Refills: 1 | Status: SHIPPED | OUTPATIENT
Start: 2022-07-22

## 2022-09-02 DIAGNOSIS — J43.9 PULMONARY EMPHYSEMA, UNSPECIFIED EMPHYSEMA TYPE (HCC): ICD-10-CM

## 2022-09-02 RX ORDER — TIOTROPIUM BROMIDE 18 UG/1
CAPSULE ORAL; RESPIRATORY (INHALATION)
Qty: 90 CAPSULE | Refills: 1 | Status: SHIPPED | OUTPATIENT
Start: 2022-09-02

## 2022-09-02 NOTE — TELEPHONE ENCOUNTER
----- Message from [facility] sent at 9/2/2022  2:43 PM EDT -----  Subject: Refill Request    QUESTIONS  Name of Medication? tiotropium (SPIRIVA HANDIHALER) 18 MCG inhalation   capsule  Patient-reported dosage and instructions? Patient does not know dosage,   taken once a day  How many days do you have left? 1  Preferred Pharmacy? [address] phone number (if available)? [phone]  Additional Information for Provider? Patient states she cannot breath   without this medication. She thought we had already sent this refill.  ---------------------------------------------------------------------------  --------------  CALL BACK INFO  What is the best way for the office to contact you? OK to leave message on   voicemail  Preferred Call Back Phone Number? [phone]  ---------------------------------------------------------------------------  --------------  SCRIPT ANSWERS  Relationship to Patient?  Self

## 2022-09-02 NOTE — TELEPHONE ENCOUNTER
.Refill Request     CONFIRM preferrred pharmacy with the patient. If Mail Order Rx - Pend for 90 day refill.       Last Seen: Last Seen Department: 4/27/2022  Last Seen by PCP: 4/27/2022    Last Written: 5/18/22 90 with 1     Next Appointment:   Future Appointments   Date Time Provider Jarred Villagomez   9/14/2022  2:30 PM DO NEO Cheema  [place]   11/11/2022  1:30 PM [facility] ED [facility] CT SC [place] Rad   11/15/2022  2:20 PM Bryon Quiñones MD CLERM [facility]       Future appointment scheduled      Requested Prescriptions     Pending Prescriptions Disp Refills    tiotropium (SPIRIVA HANDIHALER) 18 MCG inhalation capsule 90 capsule 1     Sig: INHALE THE CONTENTS OF 1 CAPSULE DAILY

## 2022-09-03 NOTE — TELEPHONE ENCOUNTER
Refill Request     CONFIRM preferrred pharmacy with the patient. If Mail Order Rx - Pend for 90 day refill.       Last Seen: Last Seen Department: 4/27/2022  Last Seen by PCP: 4/27/2022    Last Written: 3/28/2022    Next Appointment:   Future Appointments   Date Time Provider Jarred Villagomez   9/14/2022  2:30 PM DO NEO Cheema  [place]   11/11/2022  1:30 PM [facility] ED [facility] [place] [facility]   11/15/2022  2:20 PM MD CONCEPCIÓN Josue PUL[facility]       Future appointment scheduled      Requested Prescriptions     Pending Prescriptions Disp Refills    naproxen (NAPROSYN) 500 MG tablet [Pharmacy Med Name: NAPROXEN 500 MG TABLET] 180 tablet 0     Sig: TAKE ONE TABLET BY MOUTH TWICE A DAY AS NEEDED FOR PAIN

## 2022-09-04 RX ORDER — NAPROXEN 500 MG/1
TABLET ORAL
Qty: 180 TABLET | Refills: 0 | Status: SHIPPED | OUTPATIENT
Start: 2022-09-04

## 2022-09-14 ENCOUNTER — TELEPHONE (OUTPATIENT)
Dept: FAMILY MEDICINE CLINIC | Age: 72
End: 2022-09-14

## 2022-09-15 NOTE — TELEPHONE ENCOUNTER
Pt states her stool is all of a sudden once a day, no blood, really watery.    Pt takes Mylanta and Prilosec

## 2022-09-16 DIAGNOSIS — J43.9 PULMONARY EMPHYSEMA, UNSPECIFIED EMPHYSEMA TYPE (HCC): ICD-10-CM

## 2022-09-16 RX ORDER — TIOTROPIUM BROMIDE 18 UG/1
CAPSULE ORAL; RESPIRATORY (INHALATION)
Qty: 90 CAPSULE | Refills: 0 | OUTPATIENT
Start: 2022-09-16

## 2022-11-15 ENCOUNTER — TELEPHONE (OUTPATIENT)
Dept: PULMONOLOGY | Age: 72
End: 2022-11-15

## 2022-11-15 NOTE — TELEPHONE ENCOUNTER
Patient did not show for  ct lung scree/copd (CT not completed,.) appointment  with Dr. Yesica Biggs on 11/15/22    Same Day Cancellation: No    Patient rescheduled:  No    New appointment:     Patient was also no show on: na    LOV        ASSESSMENT: 05/18/22  Severe COPD   Seasonal/environmental allergies   Allergic Rhinitis   Tobacco abuse - quit 1/2017  Over 30 pack year smoking history  Anxiety and depression     PLAN:   Rx for Spiriva  Continue Symbicort  Continue PRN albuterol MDI/nebs  Continue Singulair  Claritin and Flonase  Pulm rehab declined  Return for COPD in 6 mo and LDCT   Screening CT scan was considered in a lung cancer screening counseling and shared decision making visit today that included the following elements:   Eligibility: Age:72. There are no signs or symptoms of lung cancer.   Tobacco History 55 pack-years, quit 5 years ago in 2017  Verbal counseling has been performed by me to include benefits and harms of screening, follow-up diagnostic testing, over-diagnosis, false positive rate, and total radiation exposure;   I have counseled on the importance of adherence to annual lung cancer LDCT screening, the impact of comorbidities and patient is willing to undergo diagnosis and treatment;   I have provided counseling on the importance of maintaining cigarette smoking abstinence if former smoker; or the importance of smoking cessation if current smoker and, if appropriate, furnishing of information about tobacco cessation intervention

## 2022-11-21 NOTE — TELEPHONE ENCOUNTER
Refill Request     CONFIRM preferrred pharmacy with the patient. If Mail Order Rx - Pend for 90 day refill. Last Seen: Last Seen Department: 4/27/2022  Last Seen by PCP: Visit date not found    Last Written: 11/29/2021 180 tablet 3 refills     If no future appointment scheduled, route STAFF MESSAGE with patient name to the [facility] for scheduling. Next Appointment:   No future appointments. Message sent to [address] [place] [address] to schedule appt with patient? YES    Return in about 2 months (around 6/27/2022).     Requested Prescriptions     Pending Prescriptions Disp Refills    verapamil (CALAN SR) 120 MG extended release tablet [Pharmacy Med Name: VERAPAMIL HCL ER TABS 120MG] 180 tablet 3     Sig: TAKE 2 TABLETS AT BEDTIME

## 2022-12-04 ENCOUNTER — HOSPITAL ENCOUNTER (OUTPATIENT)
Dept: CT IMAGING | Age: 72
Discharge: HOME | End: 2022-12-04
Payer: MEDICARE

## 2022-12-04 DIAGNOSIS — Z87.891 PERSONAL HISTORY OF TOBACCO USE: ICD-10-CM

## 2022-12-04 PROCEDURE — 71271 CT THORAX LUNG CANCER SCR C-: CPT

## 2022-12-05 RX ORDER — LISINOPRIL AND HYDROCHLOROTHIAZIDE 12.5; 1 MG/1; MG/1
TABLET ORAL
Qty: 90 TABLET | Refills: 1 | Status: SHIPPED | OUTPATIENT
Start: 2022-12-05

## 2022-12-05 NOTE — TELEPHONE ENCOUNTER
Refill Request     CONFIRM preferrred pharmacy with the patient. If Mail Order Rx - Pend for 90 day refill. Last Seen: Last Seen Department: 4/27/2022  Last Seen by PCP: Visit date not found    Last Written: 06/09/2022 90 tablet 1 refills     If no future appointment scheduled, route STAFF MESSAGE with patient name to the [facility] for scheduling. Next Appointment:   Future Appointments   Date Time Provider Jarred Villagomez   1/3/2023  2:00 PM MD Hansel Mendosa MMA       Message sent to [address] [place] [id number] to schedule appt with patient?   N/A      Requested Prescriptions     Pending Prescriptions Disp Refills    lisinopril-hydroCHLOROthiazide (PRINZIDE;ZESTORETIC) 10-12.5 MG per tablet [Pharmacy Med Name: LISINOPRIL-HCTZ 10-12.5 MG TAB] 90 tablet 1     Sig: TAKE ONE TABLET BY MOUTH DAILY

## 2022-12-05 NOTE — TELEPHONE ENCOUNTER
Spoke with patient, she stated she is having increased SOB lately. She had a CT lung screen done yesterday. She is asking if you could review that and let her know if she needs to come in sooner or not.

## 2022-12-06 DIAGNOSIS — R91.8 ABNORMAL CT LUNG SCREENING: Primary | ICD-10-CM

## 2022-12-12 ENCOUNTER — TELEPHONE (OUTPATIENT)
Dept: PULMONOLOGY | Age: 72
End: 2022-12-12

## 2022-12-12 NOTE — LETTER
Akbar Milton MD        December 13, 2022    [address] [place] [address]. [address]      Dear Briana Otero:    I am writing because my staff has left multiple messages asking that you call the office to schedule your follow up, but to date they have not heard from you. We care about you and the management of your healthcare and want to make sure that you follow up as recommended. As your Pulmonologist I must stress to you how important it is for you to have the tests I order as well to see me in follow up. The tests are necessary so that I can monitor your pulmonary nodule for any changes that could be cancer. I have to also mention, that in an effort to provide quality care and timely appointments to all my patients, it is our policy that patients be discharged from the practice if they do not show for three scheduled appointments. You would be informed of this termination by mail, and would have 30 days from the date of discharge to locate another physician. Please call the office to let us know your plans for treatment and to reschedule your appointment.      Sincerely,        Akbar Milton MD

## 2022-12-12 NOTE — TELEPHONE ENCOUNTER
Please send letter       Dominguez Pacheco MA   12/9/2022  8:58 AM EST Back to Top      Called pt, no answer. Also tried calling pt son, no answer, no VM set up. Dominguez Pacheco MA   12/8/2022 10:08 AM EST       Also tried calling Son Thomas Cody [phone], no answer, no VM. Dominguez Pacheco MA   12/8/2022  9:49 AM EST       Called pt, no answer, no VM. Dominguez Pacheco MA   12/7/2022  1:15 PM EST       Called pt, no answer, no VM. Will try back at another time. Will offer in a cancellation opening with Dr. Silvana Davies or  week of 12/26/22.     Dory Pop MD   12/6/2022 10:35 AM EST       I need to see patient in office   Can be in my icu weak in december   PET scan ordered

## 2022-12-30 ENCOUNTER — TELEPHONE (OUTPATIENT)
Dept: FAMILY MEDICINE CLINIC | Age: 72
End: 2022-12-30

## 2023-01-03 ENCOUNTER — TELEPHONE (OUTPATIENT)
Dept: PULMONOLOGY | Age: 73
End: 2023-01-03

## 2023-01-03 ENCOUNTER — OFFICE VISIT (OUTPATIENT)
Dept: PULMONOLOGY | Age: 73
End: 2023-01-03
Payer: MEDICARE

## 2023-01-03 VITALS
OXYGEN SATURATION: 92 % | SYSTOLIC BLOOD PRESSURE: 114 MMHG | DIASTOLIC BLOOD PRESSURE: 66 MMHG | HEART RATE: 62 BPM | RESPIRATION RATE: 16 BRPM | BODY MASS INDEX: 17.86 KG/M2 | WEIGHT: 107.2 LBS | HEIGHT: 65 IN

## 2023-01-03 DIAGNOSIS — J41.0 SIMPLE CHRONIC BRONCHITIS (HCC): Primary | ICD-10-CM

## 2023-01-03 PROCEDURE — 1123F ACP DISCUSS/DSCN MKR DOCD: CPT | Performed by: INTERNAL MEDICINE

## 2023-01-03 PROCEDURE — 99214 OFFICE O/P EST MOD 30 MIN: CPT | Performed by: INTERNAL MEDICINE

## 2023-01-03 PROCEDURE — 3074F SYST BP LT 130 MM HG: CPT | Performed by: INTERNAL MEDICINE

## 2023-01-03 PROCEDURE — 3078F DIAST BP <80 MM HG: CPT | Performed by: INTERNAL MEDICINE

## 2023-01-03 NOTE — TELEPHONE ENCOUNTER
Spoke with PET scheduling scheduled pt for next available 1/20/23 @ 8:45am scan 8:15am arrival [facility]. PET to obtain auth. When I spoke with patient in office she states if insurance does not cover PET she will not be completing will continue to follow up as Dr Cynthia Cohen would like to review results fua pending result.  Will see what pt would be responsible for

## 2023-01-03 NOTE — PROGRESS NOTES
P  [facility]                                               Pulmonary Clinic Consult     I had the pleasure of seeing  Tae Mcknight     Chief Complaint   Patient presents with    Follow-up     CT Lung screen    COPD    Established New Doctor     Dr Santoro pt        HISTORY OF PRESENT ILLNESS:    Tae Mcknight is a 71y.o. year old  Who start smoking at age  16  And 1 pack and quit 2013        The Patient comes in with SOB that has been going on the last few years and it is getting worse ,   SOB Associated with cough ,She  states that it get worse with exercise or walking long distance and he can walk 1 block at slow base And go 1few steps flight of stairs before get short winded    She use albuterol as needed and Spiriva and she lilian Symbicort       She get steroids once in a great while             ALLERGIES:    Allergies   Allergen Reactions    Acetaminophen     Codeine Other (See Comments)     Pt says she can't function on codeine    Morphine     Sulfa Antibiotics Hives       PAST MEDICAL HISTORY:       Diagnosis Date    Allergic rhinitis     Anxiety 3/12/2018    Asthma     Chronic bronchitis (HCC)     COPD (chronic obstructive pulmonary disease) ([facility] [address]     Headache(784.0)     Hypertension     Pneumonia     Primary osteoarthritis involving multiple joints 3/12/2018    Raynaud disease 4/16/2013    Tobacco abuse        MEDICATIONS:   Current Outpatient Medications   Medication Sig Dispense Refill    lisinopril-hydroCHLOROthiazide (PRINZIDE;ZESTORETIC) 10-12.5 MG per tablet TAKE ONE TABLET BY MOUTH DAILY 90 tablet 1    verapamil (CALAN SR) 120 MG extended release tablet TAKE 2 TABLETS AT BEDTIME 180 tablet 1    naproxen (NAPROSYN) 500 MG tablet TAKE ONE TABLET BY MOUTH TWICE A DAY AS NEEDED FOR PAIN 180 tablet 0    tiotropium (SPIRIVA HANDIHALER) 18 MCG inhalation capsule INHALE THE CONTENTS OF 1 CAPSULE DAILY 90 capsule 1    albuterol sulfate HFA (VENTOLIN HFA) 108 (90 Base) MCG/ACT inhaler Inhale 2 puffs into the lungs every 6 hours as needed for Wheezing or Shortness of Breath 54 g 1    budesonide-formoterol (SYMBICORT) 80-4.5 MCG/ACT AERO Inhale 2 puffs into the lungs in the morning and 2 puffs before bedtime. 30.6 g 1    citalopram (CELEXA) 20 MG tablet TAKE 1 TABLET DAILY 90 tablet 3    montelukast (SINGULAIR) 10 MG tablet TAKE 1 TABLET NIGHTLY 90 tablet 3    cyclobenzaprine (FLEXERIL) 10 MG tablet TAKE ONE TABLET BY MOUTH THREE TIMES A DAY AS NEEDED 90 tablet 1    oxybutynin (DITROPAN) 5 MG tablet TAKE 1 TABLET TWICE A  tablet 3    busPIRone (BUSPAR) 5 MG tablet 1 daily for 5 days then 1 bid 90 tablet 1    ipratropium-albuterol (DUONEB) 0.5-2.5 (3) MG/3ML SOLN nebulizer solution Inhale 3 mLs into the lungs every 4 hours as needed for Shortness of Breath 360 mL 2    Biotin w/ Vitamins C & E (HAIR/SKIN/NAILS PO) Take by mouth daily       DiphenhydrAMINE HCl (BENADRYL ALLERGY PO) Take by mouth      guaiFENesin (MUCINEX) 600 MG extended release tablet Take 1 tablet by mouth 2 times daily 60 tablet 3    Multiple Vitamins-Minerals (MULTIVITAMIN PO) Take 1 tablet by mouth daily. Potassium Gluconate 550 MG TABS Take 1 tablet by mouth daily. meloxicam (MOBIC) 7.5 MG tablet Take 1 tablet by mouth daily (Patient not taking: No sig reported) 30 tablet 3    fluticasone (FLONASE) 50 MCG/ACT nasal spray PLACE TWO SPRAYS IN EACH NOSTRIL ONCE DAILY (Patient not taking: No sig reported) 1 Bottle 5    lidocaine (LIDODERM) 5 % Place 1 patch onto the skin daily. 12 hours on, 12 hours off. (Patient not taking: No sig reported) 90 patch 0     No current facility-administered medications for this visit.        SOCIAL AND OCCUPATIONAL HEALTH:  Social History     Tobacco Use   Smoking Status Former    Packs/day: 1.00    Years: 30.00    Pack years: 30.00    Types: Cigarettes    Quit date: 2017    Years since quittin.9   Smokeless Tobacco Never     TB :no   Pets no   Industrial exposure:no   Birds :    SURGICAL HISTORY:   Past Surgical History:   Procedure Laterality Date    CATARACT REMOVAL WITH IMPLANT Bilateral 09/19/2019    CYSTOSCOPY  9/13    neg-done for incont    HYSTERECTOMY (CERVIX STATUS UNKNOWN)      TONSILLECTOMY         FAMILY HISTORY:   Lung cancer:no   DVT or PE no     REVIEW OF SYSTEMS:  Constitutional: General health is good . There has been no weight changes. No fevers, fatigue or weakness. Head: Patient denies any history of trauma, convulsive disorder or syncope. Skin:  Patient denies history of changes in pigmentation, eruptions or pruritus. No easy bruising or bleeding. EENT: no nasal congestion   Cardiovascular ,No chest pain ,No edema ,  Respiration:SOB: + ,DELATORRE :+  Gastrointestinal:No GI bleed ,no melena  ,no hematemesis    Musculoskeletal: no joint pain ,no swelling  Neurological:no , syncope. Denies twitching, convulsions, loss of consciousness or memory. Endocrine:  . No history of goiter, exophthalmos or dryness of skin. The patient has no history of diabetes. Hematopoietic:  No history of bleeding disorders or easy bruising. Rheumatic:  No connective tissue disease history or polyarthritis/inflammatory joint disease. PHYSICAL EXAMINATION:  Vitals:    01/03/23 1406   BP: 114/66   Pulse: 62   Resp: 16   SpO2: 92%     Constitutional: This is a well developed, well nourished 71y.o. year old female who is alert, oriented, cooperative and in no apparent distress. Head was normocephalic and atraumatic. EENT: Mallampati class :  Extraocular muscles intact. External canals are patent and no discharge was appreciated. Septum was midline,   mucosa was without erythema, exudates or cobblestoning. No thrush was noted. Neck: Supple without thyromegaly. No elevated JVP. Trachea was midline. No carotid bruits were auscultated. Respiratory: Rhonchi     Cardiovascular: Regular without murmur, clicks, gallops or rubs.   There is no left or right ventricular heave. Pulses:  Carotid, radial and femoral pulses were equally bilaterally. Abdomen: Slightly rounded and soft without organomegaly. No rebound, rigidity or guarding was appreciated. Lymphatic: No lymphadenopathy. Musculoskeletal: no .    Extremities: no edema   Skin:  Warm and dry. Good color, turgor and pigmentation. No lesions or scars. Neurological/Psychiatric: The patient's general behavior, level of consciousness, thought content and emotional status is normal.  Cranial nerves II-XII are intact. DATA:  8/16/14 FVC  (%) FEV1  1.11 L(42%) FEV1/FVC ratio  52  TLC  (85%)  RV  (122%)   DLCO (46%) Bronchodilator response:no  5/9/17 FVC  (77%) FEV1  1.33 L(51%) FEV1/FVC ratio  51  TLC  (92%)  RV  (128%)   DLCO (46%) Bronchodilator response:no    6MWT: 720ft        IMPRESSION:    1-Left upper lobe masses ,growing ,concern for malignancy   2-severe COPD   3-Allergic Rhinitis   Tobacco abuse - quit 1/2017  Over 30 pack year smoking history    PLAN:      -Lung mass for sure growing     -will get PET  -will need biopsy ,will see PET scan and decide if robotic vs ebus vs other ways   -need updated PFT  _ continue Symbicort   _ continue Spiriva     Flu and Pneumovax as per primary    Thank you for allowing me to participate in [facility]. I will keep following with you ,should you have any concerns ,please contact us at [facility] pulmonary office     Sincerely,        Blake Santiago MD  Pulmonary & Critical Care Medicine     NOTE: This report was transcribed using voice recognition software. Every effort was made to ensure accuracy; however, inadvertent computerized transcription errors may be present.

## 2023-01-13 NOTE — TELEPHONE ENCOUNTER
Spoke with PET scheduling whom states pt PET does not required Auth I asked if they it states what pt will be responsible for they are unable to see that. Pt will need to call her insurance and find out what she would be responsible for. Left message asking patient to return call to office.

## 2023-03-08 ENCOUNTER — TELEPHONE (OUTPATIENT)
Dept: FAMILY MEDICINE CLINIC | Age: 73
End: 2023-03-08

## 2023-03-08 NOTE — TELEPHONE ENCOUNTER
Spoke with pt she stated that she had fallen last week and now she is having left sided pain on her neck back and shoulder area her whole left leg and hurts to walk. Please advise on anything the pt can do to complete.  Pt is asking for an xray order

## 2023-03-10 ENCOUNTER — TELEPHONE (OUTPATIENT)
Dept: FAMILY MEDICINE CLINIC | Age: 73
End: 2023-03-10

## 2023-03-10 NOTE — TELEPHONE ENCOUNTER
[facility] called in stating they tried to call the number that we have on file for the pt and the number has been disconnected.  They were trying to pre register the pt for her PET SCAN    [phone]

## 2023-03-21 ENCOUNTER — TELEPHONE (OUTPATIENT)
Dept: FAMILY MEDICINE CLINIC | Age: 73
End: 2023-03-21

## 2023-03-21 NOTE — TELEPHONE ENCOUNTER
Judi Rollins called in with [facility] asking if Dr. Micheal Vazquez to sign death certificate?  Please advise please call celsa back at [phone] opt 1

## 2023-03-21 NOTE — TELEPHONE ENCOUNTER
Spoke with Veronica Alanis- she will sign over Death Certificate to our office and I let her know the physician will be back in the office on Thursday.

## 2023-10-18 NOTE — TELEPHONE ENCOUNTER
Patient called, states that patient missed appt today 09/14/22 with provider d/t diarrhea and couldn't make it in. Patient is asking Provider if she should take anything OTC for diarrhea, stating its d/t nerves and has a lot going on right now. Also Asking if she needs to reschedule this appt and if so when can Dr. Mikaela Nguyen fit her into his schedule.  Please Advise thanks Abdominal Pain, N/V/D